# Patient Record
Sex: MALE | Race: WHITE | NOT HISPANIC OR LATINO | Employment: OTHER | URBAN - METROPOLITAN AREA
[De-identification: names, ages, dates, MRNs, and addresses within clinical notes are randomized per-mention and may not be internally consistent; named-entity substitution may affect disease eponyms.]

---

## 2017-09-01 ENCOUNTER — ANESTHESIA EVENT (OUTPATIENT)
Dept: PERIOP | Facility: HOSPITAL | Age: 72
End: 2017-09-01

## 2017-09-01 ENCOUNTER — ANESTHESIA (OUTPATIENT)
Dept: PERIOP | Facility: HOSPITAL | Age: 72
End: 2017-09-01

## 2017-09-01 ENCOUNTER — HOSPITAL ENCOUNTER (OUTPATIENT)
Facility: HOSPITAL | Age: 72
Setting detail: OBSERVATION
Discharge: HOME | End: 2017-09-05
Attending: EMERGENCY MEDICINE | Admitting: SURGERY

## 2017-09-01 ENCOUNTER — APPOINTMENT (OUTPATIENT)
Dept: GENERAL RADIOLOGY | Facility: HOSPITAL | Age: 72
End: 2017-09-01

## 2017-09-01 ENCOUNTER — APPOINTMENT (OUTPATIENT)
Dept: CT IMAGING | Facility: HOSPITAL | Age: 72
End: 2017-09-01

## 2017-09-01 DIAGNOSIS — R91.1 LUNG NODULE: ICD-10-CM

## 2017-09-01 DIAGNOSIS — K80.20 CHOLELITHIASES: ICD-10-CM

## 2017-09-01 DIAGNOSIS — K81.0 ACUTE CHOLECYSTITIS: Primary | ICD-10-CM

## 2017-09-01 PROBLEM — K80.00 CALCULUS OF GALLBLADDER WITH ACUTE CHOLECYSTITIS WITHOUT OBSTRUCTION: Status: ACTIVE | Noted: 2017-09-01

## 2017-09-01 LAB
ALBUMIN SERPL-MCNC: 3.8 G/DL (ref 3.5–5.2)
ALBUMIN/GLOB SERPL: 1.1 G/DL
ALP SERPL-CCNC: 80 U/L (ref 39–117)
ALT SERPL W P-5'-P-CCNC: 45 U/L (ref 1–41)
ANION GAP SERPL CALCULATED.3IONS-SCNC: 15.2 MMOL/L
AST SERPL-CCNC: 24 U/L (ref 1–40)
BACTERIA UR QL AUTO: ABNORMAL /HPF
BASOPHILS # BLD AUTO: 0.01 10*3/MM3 (ref 0–0.2)
BASOPHILS NFR BLD AUTO: 0 % (ref 0–1.5)
BILIRUB SERPL-MCNC: 1.1 MG/DL (ref 0.1–1.2)
BILIRUB UR QL STRIP: NEGATIVE
BUN BLD-MCNC: 27 MG/DL (ref 8–23)
BUN/CREAT SERPL: 29.7 (ref 7–25)
CALCIUM SPEC-SCNC: 9.2 MG/DL (ref 8.6–10.5)
CHLORIDE SERPL-SCNC: 99 MMOL/L (ref 98–107)
CLARITY UR: CLEAR
CO2 SERPL-SCNC: 22.8 MMOL/L (ref 22–29)
COLOR UR: ABNORMAL
CREAT BLD-MCNC: 0.91 MG/DL (ref 0.76–1.27)
DEPRECATED RDW RBC AUTO: 44.5 FL (ref 37–54)
EOSINOPHIL # BLD AUTO: 0.01 10*3/MM3 (ref 0–0.7)
EOSINOPHIL NFR BLD AUTO: 0 % (ref 0.3–6.2)
ERYTHROCYTE [DISTWIDTH] IN BLOOD BY AUTOMATED COUNT: 12.8 % (ref 11.5–14.5)
GFR SERPL CREATININE-BSD FRML MDRD: 82 ML/MIN/1.73
GLOBULIN UR ELPH-MCNC: 3.6 GM/DL
GLUCOSE BLD-MCNC: 169 MG/DL (ref 65–99)
GLUCOSE UR STRIP-MCNC: NEGATIVE MG/DL
HCT VFR BLD AUTO: 44.2 % (ref 40.4–52.2)
HGB BLD-MCNC: 14.9 G/DL (ref 13.7–17.6)
HGB UR QL STRIP.AUTO: NEGATIVE
HYALINE CASTS UR QL AUTO: ABNORMAL /LPF
IMM GRANULOCYTES # BLD: 0.1 10*3/MM3 (ref 0–0.03)
IMM GRANULOCYTES NFR BLD: 0.5 % (ref 0–0.5)
INR PPP: 1.06 (ref 0.9–1.1)
KETONES UR QL STRIP: ABNORMAL
LEUKOCYTE ESTERASE UR QL STRIP.AUTO: ABNORMAL
LIPASE SERPL-CCNC: 14 U/L (ref 13–60)
LYMPHOCYTES # BLD AUTO: 1.46 10*3/MM3 (ref 0.9–4.8)
LYMPHOCYTES NFR BLD AUTO: 7.1 % (ref 19.6–45.3)
MCH RBC QN AUTO: 32.2 PG (ref 27–32.7)
MCHC RBC AUTO-ENTMCNC: 33.7 G/DL (ref 32.6–36.4)
MCV RBC AUTO: 95.5 FL (ref 79.8–96.2)
MONOCYTES # BLD AUTO: 0.85 10*3/MM3 (ref 0.2–1.2)
MONOCYTES NFR BLD AUTO: 4.1 % (ref 5–12)
NEUTROPHILS # BLD AUTO: 18.19 10*3/MM3 (ref 1.9–8.1)
NEUTROPHILS NFR BLD AUTO: 88.3 % (ref 42.7–76)
NITRITE UR QL STRIP: POSITIVE
PH UR STRIP.AUTO: 5.5 [PH] (ref 5–8)
PLATELET # BLD AUTO: 238 10*3/MM3 (ref 140–500)
PMV BLD AUTO: 11.7 FL (ref 6–12)
POTASSIUM BLD-SCNC: 4.2 MMOL/L (ref 3.5–5.2)
PROT SERPL-MCNC: 7.4 G/DL (ref 6–8.5)
PROT UR QL STRIP: NEGATIVE
PROTHROMBIN TIME: 13.4 SECONDS (ref 11.7–14.2)
RBC # BLD AUTO: 4.63 10*6/MM3 (ref 4.6–6)
RBC # UR: ABNORMAL /HPF
REF LAB TEST METHOD: ABNORMAL
SODIUM BLD-SCNC: 137 MMOL/L (ref 136–145)
SP GR UR STRIP: 1.02 (ref 1–1.03)
SQUAMOUS #/AREA URNS HPF: ABNORMAL /HPF
UROBILINOGEN UR QL STRIP: ABNORMAL
WBC NRBC COR # BLD: 20.62 10*3/MM3 (ref 4.5–10.7)
WBC UR QL AUTO: ABNORMAL /HPF

## 2017-09-01 PROCEDURE — 25010000002 KETOROLAC TROMETHAMINE PER 15 MG: Performed by: ANESTHESIOLOGY

## 2017-09-01 PROCEDURE — 47563 LAPARO CHOLECYSTECTOMY/GRAPH: CPT | Performed by: SURGERY

## 2017-09-01 PROCEDURE — 25010000002 PROPOFOL 10 MG/ML EMULSION: Performed by: ANESTHESIOLOGY

## 2017-09-01 PROCEDURE — 96375 TX/PRO/DX INJ NEW DRUG ADDON: CPT

## 2017-09-01 PROCEDURE — G0378 HOSPITAL OBSERVATION PER HR: HCPCS

## 2017-09-01 PROCEDURE — 25010000002 FENTANYL CITRATE (PF) 100 MCG/2ML SOLUTION: Performed by: ANESTHESIOLOGY

## 2017-09-01 PROCEDURE — 83690 ASSAY OF LIPASE: CPT | Performed by: EMERGENCY MEDICINE

## 2017-09-01 PROCEDURE — 85025 COMPLETE CBC W/AUTO DIFF WBC: CPT | Performed by: EMERGENCY MEDICINE

## 2017-09-01 PROCEDURE — 94799 UNLISTED PULMONARY SVC/PX: CPT

## 2017-09-01 PROCEDURE — 25010000002 MIDAZOLAM PER 1 MG: Performed by: ANESTHESIOLOGY

## 2017-09-01 PROCEDURE — 81001 URINALYSIS AUTO W/SCOPE: CPT | Performed by: EMERGENCY MEDICINE

## 2017-09-01 PROCEDURE — 25010000002 ONDANSETRON PER 1 MG: Performed by: SURGERY

## 2017-09-01 PROCEDURE — 0 IOPAMIDOL 61 % SOLUTION: Performed by: EMERGENCY MEDICINE

## 2017-09-01 PROCEDURE — 96376 TX/PRO/DX INJ SAME DRUG ADON: CPT

## 2017-09-01 PROCEDURE — 25010000002 HYDROMORPHONE PER 4 MG: Performed by: SURGERY

## 2017-09-01 PROCEDURE — 96374 THER/PROPH/DIAG INJ IV PUSH: CPT

## 2017-09-01 PROCEDURE — 99219 PR INITIAL OBSERVATION CARE/DAY 50 MINUTES: CPT | Performed by: SURGERY

## 2017-09-01 PROCEDURE — 87086 URINE CULTURE/COLONY COUNT: CPT | Performed by: EMERGENCY MEDICINE

## 2017-09-01 PROCEDURE — 25010000002 HYDROMORPHONE PER 4 MG: Performed by: EMERGENCY MEDICINE

## 2017-09-01 PROCEDURE — 99284 EMERGENCY DEPT VISIT MOD MDM: CPT

## 2017-09-01 PROCEDURE — 0 IOPAMIDOL PER 1 ML: Performed by: SURGERY

## 2017-09-01 PROCEDURE — 74177 CT ABD & PELVIS W/CONTRAST: CPT

## 2017-09-01 PROCEDURE — 93005 ELECTROCARDIOGRAM TRACING: CPT | Performed by: EMERGENCY MEDICINE

## 2017-09-01 PROCEDURE — 25010000002 ONDANSETRON PER 1 MG: Performed by: EMERGENCY MEDICINE

## 2017-09-01 PROCEDURE — 25010000002 HYDROMORPHONE PER 4 MG: Performed by: ANESTHESIOLOGY

## 2017-09-01 PROCEDURE — 80053 COMPREHEN METABOLIC PANEL: CPT | Performed by: EMERGENCY MEDICINE

## 2017-09-01 PROCEDURE — 88304 TISSUE EXAM BY PATHOLOGIST: CPT | Performed by: SURGERY

## 2017-09-01 PROCEDURE — 85610 PROTHROMBIN TIME: CPT | Performed by: EMERGENCY MEDICINE

## 2017-09-01 PROCEDURE — 74300 X-RAY BILE DUCTS/PANCREAS: CPT

## 2017-09-01 PROCEDURE — 96361 HYDRATE IV INFUSION ADD-ON: CPT

## 2017-09-01 PROCEDURE — 93010 ELECTROCARDIOGRAM REPORT: CPT | Performed by: INTERNAL MEDICINE

## 2017-09-01 RX ORDER — NALOXONE HCL 0.4 MG/ML
0.4 VIAL (ML) INJECTION
Status: DISCONTINUED | OUTPATIENT
Start: 2017-09-01 | End: 2017-09-03

## 2017-09-01 RX ORDER — MAGNESIUM HYDROXIDE 1200 MG/15ML
LIQUID ORAL AS NEEDED
Status: DISCONTINUED | OUTPATIENT
Start: 2017-09-01 | End: 2017-09-05 | Stop reason: HOSPADM

## 2017-09-01 RX ORDER — DIPHENHYDRAMINE HYDROCHLORIDE 50 MG/ML
12.5 INJECTION INTRAMUSCULAR; INTRAVENOUS
Status: DISCONTINUED | OUTPATIENT
Start: 2017-09-01 | End: 2017-09-01 | Stop reason: HOSPADM

## 2017-09-01 RX ORDER — HYDROCODONE BITARTRATE AND ACETAMINOPHEN 7.5; 325 MG/1; MG/1
1 TABLET ORAL ONCE AS NEEDED
Status: DISCONTINUED | OUTPATIENT
Start: 2017-09-01 | End: 2017-09-01 | Stop reason: HOSPADM

## 2017-09-01 RX ORDER — FLUMAZENIL 0.1 MG/ML
0.2 INJECTION INTRAVENOUS AS NEEDED
Status: DISCONTINUED | OUTPATIENT
Start: 2017-09-01 | End: 2017-09-01 | Stop reason: HOSPADM

## 2017-09-01 RX ORDER — ATORVASTATIN CALCIUM 20 MG/1
40 TABLET, FILM COATED ORAL DAILY
Status: DISCONTINUED | OUTPATIENT
Start: 2017-09-02 | End: 2017-09-05 | Stop reason: HOSPADM

## 2017-09-01 RX ORDER — FENTANYL CITRATE 50 UG/ML
INJECTION, SOLUTION INTRAMUSCULAR; INTRAVENOUS AS NEEDED
Status: DISCONTINUED | OUTPATIENT
Start: 2017-09-01 | End: 2017-09-01 | Stop reason: SURG

## 2017-09-01 RX ORDER — EPHEDRINE SULFATE 50 MG/ML
5 INJECTION, SOLUTION INTRAVENOUS ONCE AS NEEDED
Status: DISCONTINUED | OUTPATIENT
Start: 2017-09-01 | End: 2017-09-01 | Stop reason: HOSPADM

## 2017-09-01 RX ORDER — ENALAPRIL MALEATE 20 MG/1
20 TABLET ORAL DAILY
COMMUNITY

## 2017-09-01 RX ORDER — SODIUM CHLORIDE, SODIUM LACTATE, POTASSIUM CHLORIDE, CALCIUM CHLORIDE 600; 310; 30; 20 MG/100ML; MG/100ML; MG/100ML; MG/100ML
9 INJECTION, SOLUTION INTRAVENOUS CONTINUOUS
Status: DISCONTINUED | OUTPATIENT
Start: 2017-09-01 | End: 2017-09-01

## 2017-09-01 RX ORDER — HYDROMORPHONE HYDROCHLORIDE 1 MG/ML
0.5 INJECTION, SOLUTION INTRAMUSCULAR; INTRAVENOUS; SUBCUTANEOUS
Status: DISCONTINUED | OUTPATIENT
Start: 2017-09-01 | End: 2017-09-01 | Stop reason: HOSPADM

## 2017-09-01 RX ORDER — ROCURONIUM BROMIDE 10 MG/ML
INJECTION, SOLUTION INTRAVENOUS AS NEEDED
Status: DISCONTINUED | OUTPATIENT
Start: 2017-09-01 | End: 2017-09-01 | Stop reason: SURG

## 2017-09-01 RX ORDER — ONDANSETRON 2 MG/ML
4 INJECTION INTRAMUSCULAR; INTRAVENOUS EVERY 6 HOURS PRN
Status: DISCONTINUED | OUTPATIENT
Start: 2017-09-01 | End: 2017-09-05 | Stop reason: HOSPADM

## 2017-09-01 RX ORDER — MIDAZOLAM HYDROCHLORIDE 1 MG/ML
2 INJECTION INTRAMUSCULAR; INTRAVENOUS
Status: DISCONTINUED | OUTPATIENT
Start: 2017-09-01 | End: 2017-09-01 | Stop reason: HOSPADM

## 2017-09-01 RX ORDER — FAMOTIDINE 10 MG/ML
20 INJECTION, SOLUTION INTRAVENOUS ONCE
Status: COMPLETED | OUTPATIENT
Start: 2017-09-01 | End: 2017-09-01

## 2017-09-01 RX ORDER — HYDROMORPHONE HYDROCHLORIDE 1 MG/ML
0.5 INJECTION, SOLUTION INTRAMUSCULAR; INTRAVENOUS; SUBCUTANEOUS ONCE
Status: COMPLETED | OUTPATIENT
Start: 2017-09-01 | End: 2017-09-01

## 2017-09-01 RX ORDER — LIDOCAINE HYDROCHLORIDE 20 MG/ML
INJECTION, SOLUTION INFILTRATION; PERINEURAL AS NEEDED
Status: DISCONTINUED | OUTPATIENT
Start: 2017-09-01 | End: 2017-09-01 | Stop reason: SURG

## 2017-09-01 RX ORDER — PROMETHAZINE HYDROCHLORIDE 25 MG/ML
12.5 INJECTION, SOLUTION INTRAMUSCULAR; INTRAVENOUS ONCE AS NEEDED
Status: DISCONTINUED | OUTPATIENT
Start: 2017-09-01 | End: 2017-09-01 | Stop reason: HOSPADM

## 2017-09-01 RX ORDER — MIDAZOLAM HYDROCHLORIDE 1 MG/ML
1 INJECTION INTRAMUSCULAR; INTRAVENOUS
Status: DISCONTINUED | OUTPATIENT
Start: 2017-09-01 | End: 2017-09-01 | Stop reason: HOSPADM

## 2017-09-01 RX ORDER — LEVOFLOXACIN 5 MG/ML
500 INJECTION, SOLUTION INTRAVENOUS
Status: DISCONTINUED | OUTPATIENT
Start: 2017-09-01 | End: 2017-09-01 | Stop reason: HOSPADM

## 2017-09-01 RX ORDER — HYDROCHLOROTHIAZIDE 12.5 MG/1
25 CAPSULE, GELATIN COATED ORAL DAILY
Status: DISCONTINUED | OUTPATIENT
Start: 2017-09-02 | End: 2017-09-05 | Stop reason: HOSPADM

## 2017-09-01 RX ORDER — FENTANYL CITRATE 50 UG/ML
50 INJECTION, SOLUTION INTRAMUSCULAR; INTRAVENOUS
Status: DISCONTINUED | OUTPATIENT
Start: 2017-09-01 | End: 2017-09-01 | Stop reason: HOSPADM

## 2017-09-01 RX ORDER — PROPOFOL 10 MG/ML
VIAL (ML) INTRAVENOUS AS NEEDED
Status: DISCONTINUED | OUTPATIENT
Start: 2017-09-01 | End: 2017-09-01 | Stop reason: SURG

## 2017-09-01 RX ORDER — EPHEDRINE SULFATE 50 MG/ML
INJECTION, SOLUTION INTRAVENOUS AS NEEDED
Status: DISCONTINUED | OUTPATIENT
Start: 2017-09-01 | End: 2017-09-01 | Stop reason: SURG

## 2017-09-01 RX ORDER — HYDROCODONE BITARTRATE AND ACETAMINOPHEN 5; 325 MG/1; MG/1
1 TABLET ORAL EVERY 4 HOURS PRN
Status: DISCONTINUED | OUTPATIENT
Start: 2017-09-01 | End: 2017-09-03

## 2017-09-01 RX ORDER — SODIUM CHLORIDE 0.9 % (FLUSH) 0.9 %
1-10 SYRINGE (ML) INJECTION AS NEEDED
Status: DISCONTINUED | OUTPATIENT
Start: 2017-09-01 | End: 2017-09-05 | Stop reason: HOSPADM

## 2017-09-01 RX ORDER — HYDROMORPHONE HYDROCHLORIDE 1 MG/ML
0.5 INJECTION, SOLUTION INTRAMUSCULAR; INTRAVENOUS; SUBCUTANEOUS
Status: DISCONTINUED | OUTPATIENT
Start: 2017-09-01 | End: 2017-09-03

## 2017-09-01 RX ORDER — DEXTROSE, SODIUM CHLORIDE, AND POTASSIUM CHLORIDE 5; .45; .15 G/100ML; G/100ML; G/100ML
100 INJECTION INTRAVENOUS CONTINUOUS
Status: DISCONTINUED | OUTPATIENT
Start: 2017-09-01 | End: 2017-09-02

## 2017-09-01 RX ORDER — LIDOCAINE HYDROCHLORIDE 10 MG/ML
0.5 INJECTION, SOLUTION EPIDURAL; INFILTRATION; INTRACAUDAL; PERINEURAL ONCE AS NEEDED
Status: DISCONTINUED | OUTPATIENT
Start: 2017-09-01 | End: 2017-09-01 | Stop reason: HOSPADM

## 2017-09-01 RX ORDER — HYDRALAZINE HYDROCHLORIDE 20 MG/ML
5 INJECTION INTRAMUSCULAR; INTRAVENOUS
Status: DISCONTINUED | OUTPATIENT
Start: 2017-09-01 | End: 2017-09-01 | Stop reason: HOSPADM

## 2017-09-01 RX ORDER — PROMETHAZINE HYDROCHLORIDE 25 MG/1
12.5 TABLET ORAL ONCE AS NEEDED
Status: DISCONTINUED | OUTPATIENT
Start: 2017-09-01 | End: 2017-09-01 | Stop reason: HOSPADM

## 2017-09-01 RX ORDER — LABETALOL HYDROCHLORIDE 5 MG/ML
5 INJECTION, SOLUTION INTRAVENOUS
Status: DISCONTINUED | OUTPATIENT
Start: 2017-09-01 | End: 2017-09-01 | Stop reason: HOSPADM

## 2017-09-01 RX ORDER — KETOROLAC TROMETHAMINE 30 MG/ML
INJECTION, SOLUTION INTRAMUSCULAR; INTRAVENOUS AS NEEDED
Status: DISCONTINUED | OUTPATIENT
Start: 2017-09-01 | End: 2017-09-01 | Stop reason: SURG

## 2017-09-01 RX ORDER — SODIUM CHLORIDE 0.9 % (FLUSH) 0.9 %
1-10 SYRINGE (ML) INJECTION AS NEEDED
Status: DISCONTINUED | OUTPATIENT
Start: 2017-09-01 | End: 2017-09-01 | Stop reason: HOSPADM

## 2017-09-01 RX ORDER — ONDANSETRON 2 MG/ML
4 INJECTION INTRAMUSCULAR; INTRAVENOUS ONCE
Status: COMPLETED | OUTPATIENT
Start: 2017-09-01 | End: 2017-09-01

## 2017-09-01 RX ORDER — ONDANSETRON 2 MG/ML
4 INJECTION INTRAMUSCULAR; INTRAVENOUS ONCE AS NEEDED
Status: DISCONTINUED | OUTPATIENT
Start: 2017-09-01 | End: 2017-09-01 | Stop reason: HOSPADM

## 2017-09-01 RX ORDER — NALOXONE HCL 0.4 MG/ML
0.2 VIAL (ML) INJECTION AS NEEDED
Status: DISCONTINUED | OUTPATIENT
Start: 2017-09-01 | End: 2017-09-01 | Stop reason: HOSPADM

## 2017-09-01 RX ORDER — PROMETHAZINE HYDROCHLORIDE 25 MG/1
25 TABLET ORAL ONCE AS NEEDED
Status: DISCONTINUED | OUTPATIENT
Start: 2017-09-01 | End: 2017-09-01 | Stop reason: HOSPADM

## 2017-09-01 RX ORDER — OXYCODONE AND ACETAMINOPHEN 7.5; 325 MG/1; MG/1
1 TABLET ORAL ONCE AS NEEDED
Status: DISCONTINUED | OUTPATIENT
Start: 2017-09-01 | End: 2017-09-01 | Stop reason: HOSPADM

## 2017-09-01 RX ORDER — GLYCOPYRROLATE 0.2 MG/ML
INJECTION INTRAMUSCULAR; INTRAVENOUS AS NEEDED
Status: DISCONTINUED | OUTPATIENT
Start: 2017-09-01 | End: 2017-09-01 | Stop reason: SURG

## 2017-09-01 RX ORDER — PROMETHAZINE HYDROCHLORIDE 25 MG/1
25 SUPPOSITORY RECTAL ONCE AS NEEDED
Status: DISCONTINUED | OUTPATIENT
Start: 2017-09-01 | End: 2017-09-01 | Stop reason: HOSPADM

## 2017-09-01 RX ORDER — BUPIVACAINE HYDROCHLORIDE AND EPINEPHRINE 5; 5 MG/ML; UG/ML
INJECTION, SOLUTION EPIDURAL; INTRACAUDAL; PERINEURAL AS NEEDED
Status: DISCONTINUED | OUTPATIENT
Start: 2017-09-01 | End: 2017-09-05 | Stop reason: HOSPADM

## 2017-09-01 RX ADMIN — SODIUM CHLORIDE, POTASSIUM CHLORIDE, SODIUM LACTATE AND CALCIUM CHLORIDE 9 ML/HR: 600; 310; 30; 20 INJECTION, SOLUTION INTRAVENOUS at 16:38

## 2017-09-01 RX ADMIN — GLYCOPYRROLATE 0.2 MG: 0.2 INJECTION INTRAMUSCULAR; INTRAVENOUS at 18:05

## 2017-09-01 RX ADMIN — HYDROMORPHONE HYDROCHLORIDE 0.5 MG: 1 INJECTION, SOLUTION INTRAMUSCULAR; INTRAVENOUS; SUBCUTANEOUS at 10:39

## 2017-09-01 RX ADMIN — PROPOFOL 50 MG: 10 INJECTION, EMULSION INTRAVENOUS at 17:42

## 2017-09-01 RX ADMIN — LIDOCAINE HYDROCHLORIDE 50 MG: 20 INJECTION, SOLUTION INFILTRATION; PERINEURAL at 17:39

## 2017-09-01 RX ADMIN — HYDROMORPHONE HYDROCHLORIDE 0.5 MG: 1 INJECTION, SOLUTION INTRAMUSCULAR; INTRAVENOUS; SUBCUTANEOUS at 19:33

## 2017-09-01 RX ADMIN — SUGAMMADEX 3 ML: 100 INJECTION, SOLUTION INTRAVENOUS at 18:39

## 2017-09-01 RX ADMIN — HYDROMORPHONE HYDROCHLORIDE 0.5 MG: 1 INJECTION, SOLUTION INTRAMUSCULAR; INTRAVENOUS; SUBCUTANEOUS at 07:38

## 2017-09-01 RX ADMIN — POTASSIUM CHLORIDE, DEXTROSE MONOHYDRATE AND SODIUM CHLORIDE 100 ML/HR: 150; 5; 450 INJECTION, SOLUTION INTRAVENOUS at 09:47

## 2017-09-01 RX ADMIN — ROCURONIUM BROMIDE 40 MG: 10 INJECTION INTRAVENOUS at 17:39

## 2017-09-01 RX ADMIN — EPHEDRINE SULFATE 10 MG: 50 INJECTION INTRAMUSCULAR; INTRAVENOUS; SUBCUTANEOUS at 18:03

## 2017-09-01 RX ADMIN — FENTANYL CITRATE 50 MCG: 50 INJECTION INTRAMUSCULAR; INTRAVENOUS at 18:12

## 2017-09-01 RX ADMIN — KETOROLAC TROMETHAMINE 30 MG: 30 INJECTION, SOLUTION INTRAMUSCULAR; INTRAVENOUS at 18:25

## 2017-09-01 RX ADMIN — SODIUM CHLORIDE, POTASSIUM CHLORIDE, SODIUM LACTATE AND CALCIUM CHLORIDE: 600; 310; 30; 20 INJECTION, SOLUTION INTRAVENOUS at 17:35

## 2017-09-01 RX ADMIN — FAMOTIDINE 20 MG: 10 INJECTION, SOLUTION INTRAVENOUS at 16:39

## 2017-09-01 RX ADMIN — MIDAZOLAM 1 MG: 1 INJECTION INTRAMUSCULAR; INTRAVENOUS at 16:39

## 2017-09-01 RX ADMIN — FENTANYL CITRATE 50 MCG: 50 INJECTION INTRAMUSCULAR; INTRAVENOUS at 17:39

## 2017-09-01 RX ADMIN — HYDROMORPHONE HYDROCHLORIDE 0.5 MG: 1 INJECTION, SOLUTION INTRAMUSCULAR; INTRAVENOUS; SUBCUTANEOUS at 22:43

## 2017-09-01 RX ADMIN — SODIUM CHLORIDE, POTASSIUM CHLORIDE, SODIUM LACTATE AND CALCIUM CHLORIDE: 600; 310; 30; 20 INJECTION, SOLUTION INTRAVENOUS at 18:29

## 2017-09-01 RX ADMIN — ONDANSETRON 4 MG: 2 INJECTION INTRAMUSCULAR; INTRAVENOUS at 07:37

## 2017-09-01 RX ADMIN — HYDROCODONE BITARTRATE AND ACETAMINOPHEN 1 TABLET: 5; 325 TABLET ORAL at 19:50

## 2017-09-01 RX ADMIN — HYDROMORPHONE HYDROCHLORIDE 0.5 MG: 1 INJECTION, SOLUTION INTRAMUSCULAR; INTRAVENOUS; SUBCUTANEOUS at 19:09

## 2017-09-01 RX ADMIN — IOPAMIDOL 85 ML: 612 INJECTION, SOLUTION INTRAVENOUS at 08:11

## 2017-09-01 RX ADMIN — ONDANSETRON 4 MG: 2 INJECTION INTRAMUSCULAR; INTRAVENOUS at 18:10

## 2017-09-01 RX ADMIN — PROPOFOL 150 MG: 10 INJECTION, EMULSION INTRAVENOUS at 17:39

## 2017-09-01 RX ADMIN — HYDROMORPHONE HYDROCHLORIDE 0.5 MG: 1 INJECTION, SOLUTION INTRAMUSCULAR; INTRAVENOUS; SUBCUTANEOUS at 08:52

## 2017-09-01 NOTE — H&P
General Surgery  History and Physical    CC: Abdominal pain    HPI: The patient is a pleasant 72 y.o. year-old gentleman who presents today for evaluation of burning epigastric pain that awoke him from sleep this morning at 01:30 AM. He states the pain has been constant in duration and worsening in severity since its onset. Laying flat seems to help the pain a bit, but nothing seems to completely alleviate the pain. He has noticed no associated with PO intake. He had a similar episode last Monday that resolved on its own. He has had some associated nausea and vomiting with both today's episode as well as the similar event last Monday. A CT abdomen/pelvis performed in the ED shows acute cholecystitis with cholelithiasis. He is being treated currently for a UTI as well as bronchitis with Bactrim and a medrol steroid taper.    Past Medical History: Hypertension, hyperlipidemia, diabetes mellitus, recent UTI and bronchitis    Past Surgical History: Tonsillectomy    Medications:   Metformin 750 mg daily  Atorvastatin 40 mg daily  Hydrochlorothiazide 25 mg daily  Medrol steroid pack    Allergies: Penicillins and Percodan    Family History: No family history of anyone with gallbladder disease    Social History: , nonsmoker, no regular alcohol use    ROS: A comprehensive review of systems was conducted and significant only for the following positives: abdominal pain, nausea, and vomiting  All other systems reviewed and negative    Physical Exam:  Vitals:    09/01/17 1141   BP:    Pulse: 68   Resp: 16   Temp:    SpO2:      General: No acute distress, well-nourished & well-developed  HEAD: normocephalic, atraumatic  EYES: normal conjunctiva, sclera anicteric  EARS: grossly normal hearing  NECK: supple, no thyromegaly  CARDIOVASCULAR: regular rate and rhythm  RESPIRATORY: clear to auscultation bilaterally  GASTROINTESTINAL: soft, nontender, non-distended  PSYCHIATRIC: oriented x3, normal mood and  affect    LABS:    Results from last 7 days  Lab Units 09/01/17  0640   WBC 10*3/mm3 20.62*   HEMOGLOBIN g/dL 14.9   HEMATOCRIT % 44.2   PLATELETS 10*3/mm3 238       Results from last 7 days  Lab Units 09/01/17  0640   SODIUM mmol/L 137   POTASSIUM mmol/L 4.2   CHLORIDE mmol/L 99   CO2 mmol/L 22.8   BUN mg/dL 27*   CREATININE mg/dL 0.91   CALCIUM mg/dL 9.2   BILIRUBIN mg/dL 1.1   ALK PHOS U/L 80   ALT (SGPT) U/L 45*   AST (SGOT) U/L 24   GLUCOSE mg/dL 169*       IMAGING:  CT ABDOMEN/PELVIS:  1. Distended gallbladder with submucosal edema and cholelithiasis. The findings are most suspicious for acute cholecystitis.  2. A 7 mm right lower lobe nodule for which followup CT chest is recommended in 3 months to reevaluate. L5-S1 degenerative disc disease with vacuum disc phenomenon.  3. Nonobstructing right nephrolith.  4. Mild dilatation of the pancreatic duct especially in the head where it measures up to 6 mm. There is also an additional low attenuating 9 mm lesion within the body. In the absence of any remote imaging, consider followup with MRI of the pancreas and MRCP on a nonemergent basis.    ASSESSMENT & PLAN  Mr. Cleaning is a 72 year-old gentleman with acute cholecystitis and cholelithiasis. I have recommended we proceed with laparoscopic cholecystectomy with cholangiogram today to alleviate his symptoms and prevent possible gallstone induced pancreatitis. He understands that if his cholangiogram shows any signs of a CBD stone, this would warrant ERCP tomorrow. Otherwise, if all goes well I see no reason he won't be able to be discharged tomorrow post-operatively. We discussed the risks of the procedure to include bleeding, possible common bile duct injury, and possible post-operative bile leak. Despite these risks, he has consented to proceed.    Crystal Mclean MD  General and Endoscopic Surgery  [facility]    [address], [address]  [place], [place], [address]  P: [phone]  F: [phone]

## 2017-09-01 NOTE — ED TRIAGE NOTES
Burning epigastric pain since 0130 this am.  States had an episode of same Monday night with associated nausea that resolved.

## 2017-09-01 NOTE — ANESTHESIA PROCEDURE NOTES
Airway  Urgency: elective    Airway not difficult    General Information and Staff    Patient location during procedure: OR  Anesthesiologist: MELANIE JERNIGAN    Indications and Patient Condition  Indications for airway management: airway protection    Preoxygenated: yes  MILS maintained throughout  Mask difficulty assessment: 1 - vent by mask    Final Airway Details  Final airway type: endotracheal airway      Successful airway: ETT  Cuffed: yes   Successful intubation technique: direct laryngoscopy  Facilitating devices/methods: Bougie and intubating stylet  Endotracheal tube insertion site: oral  Blade: Tera  Blade size: #3  ETT size: 7.5 mm  Cormack-Lehane Classification: grade III - view of epiglottis only  Placement verified by: chest auscultation and capnometry   Measured from: lips  ETT to lips (cm): 23  Number of attempts at approach: 3 or more

## 2017-09-01 NOTE — PLAN OF CARE
Problem: Patient Care Overview (Adult)  Goal: Plan of Care Review  Outcome: Ongoing (interventions implemented as appropriate)    09/01/17 1428   Coping/Psychosocial Response Interventions   Plan Of Care Reviewed With patient;spouse   Patient Care Overview   Progress improving   Outcome Evaluation   Outcome Summary/Follow up Plan iv dilaudid given for pain control, npo for lap lucina later today, consent signed       Goal: Adult Individualization and Mutuality  Outcome: Ongoing (interventions implemented as appropriate)  Goal: Discharge Needs Assessment  Outcome: Ongoing (interventions implemented as appropriate)    Problem: Pain, Acute (Adult)  Goal: Identify Related Risk Factors and Signs and Symptoms  Outcome: Outcome(s) achieved Date Met:  09/01/17  Goal: Acceptable Pain Control/Comfort Level  Outcome: Ongoing (interventions implemented as appropriate)

## 2017-09-01 NOTE — ED PROVIDER NOTES
" EMERGENCY DEPARTMENT ENCOUNTER    CHIEF COMPLAINT  Chief Complaint: abdominal pain  History given by: pt  History limited by: N/A  Room Number: 19/19  PMD: Sidney Moreno MD      HPI:  Pt is a 72 y.o. male who presents complaining of epigastric constant burning pain that began at 0130 this morning. Pt states on 8/27/17 he experienced N/V, but these symptoms resolved. Pt also c/o of mild dysuria that he has recently seen his PCP for and placed on antibiotics. Pt states the pain worsens when he sits up; pain is mild when pt is resting, but when he sits up it becomes moderate.   Pt is currently on Bactrim and solu-medrol.     Duration/Onset/Timing:   Location: Epigastric abdomen   Radiation: None reported   Quality: \"burning\"  Intensity/Severity: mild to moderate depending on position   Associated Symptoms: N/V, mild dysuria  Aggravating or Alleviating Factors: Sitting up  Previous Episodes: Pt had initial symptoms of N/V on Monday 8/27/17.       PAST MEDICAL HISTORY  Active Ambulatory Problems     Diagnosis Date Noted   • No Active Ambulatory Problems     Resolved Ambulatory Problems     Diagnosis Date Noted   • No Resolved Ambulatory Problems     Past Medical History:   Diagnosis Date   • Bronchitis    • Hyperlipidemia    • Hypertension    • UTI (urinary tract infection)        PAST SURGICAL HISTORY  Past Surgical History:   Procedure Laterality Date   • TONSILLECTOMY         FAMILY HISTORY  History reviewed. No pertinent family history.    SOCIAL HISTORY  Social History     Social History   • Marital status:      Spouse name: N/A   • Number of children: N/A   • Years of education: N/A     Occupational History   • Not on file.     Social History Main Topics   • Smoking status: Never Smoker   • Smokeless tobacco: Not on file   • Alcohol use No   • Drug use: No   • Sexual activity: Not on file     Other Topics Concern   • Not on file     Social History Narrative   • No narrative on file "       ALLERGIES  Penicillins and Percodan [oxycodone-aspirin]    REVIEW OF SYSTEMS  Review of Systems   Constitutional: Negative.  Negative for chills and fever.   HENT: Negative.  Negative for sore throat.    Eyes: Negative.    Respiratory: Negative.  Negative for cough and shortness of breath.    Cardiovascular: Negative.  Negative for chest pain.   Gastrointestinal: Positive for abdominal pain (epigastric ), nausea and vomiting (on Monday ). Negative for blood in stool and diarrhea.   Genitourinary: Positive for dysuria.   Musculoskeletal: Negative.  Negative for back pain.   Skin: Negative.  Negative for rash.   Neurological: Negative.  Negative for headaches.       PHYSICAL EXAM  ED Triage Vitals   Temp Heart Rate Resp BP SpO2   09/01/17 0549 09/01/17 0549 09/01/17 0549 09/01/17 0551 09/01/17 0549   97.2 °F (36.2 °C) 83 16 130/80 94 %      Temp src Heart Rate Source Patient Position BP Location FiO2 (%)   -- 09/01/17 0549 -- -- --    Monitor          Physical Exam   Constitutional: No distress.   HENT:   Head: Normocephalic and atraumatic.   Mouth/Throat: Oropharynx is clear and moist.   Eyes:   Unremarkable   Cardiovascular: Normal rate and regular rhythm.    Pulmonary/Chest: Breath sounds normal. No respiratory distress.   Abdominal: There is tenderness (mild epigastric and RUQ).   Musculoskeletal: He exhibits no edema or tenderness.   Neurological: He is alert.   Skin: No rash noted.   Nursing note and vitals reviewed.      LAB RESULTS  Lab Results (last 24 hours)     Procedure Component Value Units Date/Time    Comprehensive Metabolic Panel [[id number]]  (Abnormal) Collected:  09/01/17 0640    Specimen:  Blood Updated:  09/01/17 0718     Glucose 169 (H) mg/dL      BUN 27 (H) mg/dL      Creatinine 0.91 mg/dL      Sodium 137 mmol/L      Potassium 4.2 mmol/L      Chloride 99 mmol/L      CO2 22.8 mmol/L      Calcium 9.2 mg/dL      Total Protein 7.4 g/dL      Albumin 3.80 g/dL      ALT (SGPT) 45 (H) U/L      AST  (SGOT) 24 U/L      Alkaline Phosphatase 80 U/L      Total Bilirubin 1.1 mg/dL      eGFR Non African Amer 82 mL/min/1.73      Globulin 3.6 gm/dL      A/G Ratio 1.1 g/dL      BUN/Creatinine Ratio 29.7 (H)     Anion Gap 15.2 mmol/L     Narrative:       The MDRD GFR formula is only valid for adults with stable renal function between ages 18 and 70.    CBC & Differential [[id number]] Collected:  09/01/17 0640    Specimen:  Blood Updated:  09/01/17 0652    Narrative:       The following orders were created for panel order CBC & Differential.  Procedure                               Abnormality         Status                     ---------                               -----------         ------                     CBC Auto Differential[[id number]]        Abnormal            Final result                 Please view results for these tests on the individual orders.    Protime-INR [[id number]]  (Normal) Collected:  09/01/17 0640    Specimen:  Blood Updated:  09/01/17 0702     Protime 13.4 Seconds      INR 1.06    CBC Auto Differential [[id number]]  (Abnormal) Collected:  09/01/17 0640    Specimen:  Blood Updated:  09/01/17 0652     WBC 20.62 (H) 10*3/mm3      RBC 4.63 10*6/mm3      Hemoglobin 14.9 g/dL      Hematocrit 44.2 %      MCV 95.5 fL      MCH 32.2 pg      MCHC 33.7 g/dL      RDW 12.8 %      RDW-SD 44.5 fl      MPV 11.7 fL      Platelets 238 10*3/mm3      Neutrophil % 88.3 (H) %      Lymphocyte % 7.1 (L) %      Monocyte % 4.1 (L) %      Eosinophil % 0.0 (L) %      Basophil % 0.0 %      Immature Grans % 0.5 %      Neutrophils, Absolute 18.19 (H) 10*3/mm3      Lymphocytes, Absolute 1.46 10*3/mm3      Monocytes, Absolute 0.85 10*3/mm3      Eosinophils, Absolute 0.01 10*3/mm3      Basophils, Absolute 0.01 10*3/mm3      Immature Grans, Absolute 0.10 (H) 10*3/mm3     Lipase [[id number]]  (Normal) Collected:  09/01/17 0640    Specimen:  Blood Updated:  09/01/17 0718     Lipase 14 U/L     Urinalysis With / Culture If Indicated  [[id number]] Collected:  09/01/17 0806    Specimen:  Urine from Urine, Clean Catch Updated:  09/01/17 0809    Urinalysis, Microscopic Only [[id number]] Collected:  09/01/17 0806    Specimen:  Urine from Urine, Clean Catch Updated:  09/01/17 0816    Urine Culture [[id number]] Collected:  09/01/17 0806    Specimen:  Urine from Urine, Clean Catch Updated:  09/01/17 0816          I ordered the above labs and reviewed the results    RADIOLOGY  CT Abdomen Pelvis With Contrast    (Results Pending)  CT Abdomen Pelvis:   1) Acute cholecystitis   2) Right Upper Lung Nodule         I ordered the above noted radiological studies. Interpreted by radiologist. Reviewed by me in PACS.       PROCEDURES  Procedures  EKG           EKG time: 0617  Rhythm/Rate: sinus 67  P waves and UT: normal   QRS, axis: normal   ST and T waves: normal     Interpreted Contemporaneously by me, independently viewed  No previous for comparison.      PROGRESS AND CONSULTS  ED Course   0721: Ordered IVF, labs, and CT abdomen/ Pelvis for further evaluation.    0721: Ordered Zofran ad Dilaudid for pain.    0829: Placed consult to Surgery.    0833: Rechecked pt and discussed cholecystectomy. Discussed plan for admission. PT will need to f/u with PCP for lung nodule. Pt understands and agrees to plan. All questions addressed at time.     0840: Discussed pt's case with Dr. Mclean [surgery] who agrees to admit.     MEDICAL DECISION MAKING  Results were reviewed/discussed with the patient and they were also made aware of online access. Pt also made aware that some labs, such as cultures, will not be resulted during ER visit and follow up with PMD is necessary.     MDM  Number of Diagnoses or Management Options     Amount and/or Complexity of Data Reviewed  Clinical lab tests: ordered and reviewed (Protime = 13.4, INR = 1.06, WBC = 20.62, BUN = 27  )  Tests in the radiology section of CPT®: ordered and reviewed (CT Abdomen Pelvis:   1) Acute cholecystitis   2)  Right Upper Lung Nodule )  Tests in the medicine section of CPT®: ordered and reviewed (Refer to procedure )  Discussion of test results with the performing providers: yes (Dr. Mclean (surgery) )           DIAGNOSIS  Final diagnoses:   Acute cholecystitis   Lung nodule       DISPOSITION  ADMISSION    Discussed treatment plan and reason for admission with pt/family and admitting physician.  Pt/family voiced understanding of the plan for admission for further testing/treatment as needed.         Latest Documented Vital Signs:  As of 8:42 AM  BP- 124/75 HR- 75 Temp- 97.2 °F (36.2 °C) O2 sat- 94%    --  Documentation assistance provided by zainab Garza for Dr. Jones.  Information recorded by the scribe was done at my direction and has been verified and validated by me.     Scott Garza  09/01/17 0842       Josh Jones MD  09/01/17 0804

## 2017-09-01 NOTE — OP NOTE
Operative Note :  Crystal Mclean MD      Camden Cleaning  1945    Procedure Date: 09/01/17    Pre-op Diagnosis:  · Acute cholecystitis with cholelithiasis    Post-op Diagnosis:  · Acute cholecystitis with cholelithiasis    Procedure:   · Laparoscopic cholecystectomy with intraoperative cholangiogram    Surgeon: Crystal Mclean MD    Assistant: Lakisha PEDROZA    Anesthesia:  General (general endotracheal tube)    Estimated Blood Loss: 50 cc    Specimens: Gallbladder    Complications: None    Indications:  · Mr. Cleaning is a 72 year-old gentleman with acute cholecystitis and cholelithiasis who was admitted through the emergency room earlier today. He is being brought to the operating room this evening for laparoscopic versus open cholecystectomy. He has been counseled on the risks of the procedure and has consented to proceed.    Findings:   · Very inflamed gallbladder with multiple black tiny gallstones, normal cholangiogram    Description of procedure:  The patient was brought to the operating room and placed on the OR table in supine position.  An endotracheal tube was inserted, and general anesthesia was induced.  His anterior abdominal wall was shaved, prepped, and draped in a sterile fashion.  A surgical timeout was then completed.  A curvilinear infraumbilical skin incision was made after instillation of 0.5% Marcaine with epinephrine.  The umbilical stalk was grasped and elevated, and a longitudinal fasciotomy made.  A Houser trocar was inserted and secured with 2 separate 0 Vicryl stay uterus.  The abdomen was then insufflated.  Under direct visualization, 3 additional 5 mm trochars were then placed within the subxiphoid and subcostal space on the right.  The gallbladder was retracted cephalad and infundibulum retracted inferiorly. The cystic duct and cystic artery were slowly dissected out circumferentially until a firm critical view was obtained.  A single Hemoclip was placed across the cystic duct at  its junction with the gallbladder infundibulum and 3 hemoclips were placed across the cystic artery.  A small hole was created on the anterior wall of the cystic duct, and a cholangiogram catheter inserted through a right upper quadrant angiocatheter.  The catheter was secured within the duct with an additional Hemoclip and a cholangiogram was then obtained.  The cholangiogram showed normal filling of the common bile duct, right and left intrahepatic ducts, and spillage into the duodenum. The common duct was somewhat dilated, but no filling defects were appreciable.  The catheter was then withdrawn and 2 additional hemoclips placed across the cystic duct.  The cystic duct and artery were then transected, leaving 2 clips behind on both of the stumps.  The gallbladder was then slowly dissected off of the surface of the liver using electrocautery and it was removed from the peritoneal cavity within an Endo Catch bag at the umbilical trocar site.  The gallbladder was passed off to pathology.  The abdomen was then reinsufflated and right upper quadrant inspected. The liver edge was fairly friable, so additional electrocautery was used to achieve hemostasis and a piece of Surgicel was placed into the gallbladder fossa.  The right upper quadrant was copiously irrigated and suctioned dry.  All trochars were then removed under direct visualization and the abdomen desufflated.  The umbilical fascial incision was closed using the previously placed 0 Vicryl stay sutures, all skin incisions closed with 4-0 Vicryl subcuticular stitches, and then each was dressed with Dermabond.  The patient was then extubated and transferred to PACU in stable condition.  All counts were correct per nursing.    Crystal Mclean MD  General and Endoscopic Surgery  [facility]    [address], [address]  [place], [place], [address]  P: [phone]  F: [phone]

## 2017-09-01 NOTE — ANESTHESIA PREPROCEDURE EVALUATION
Anesthesia Evaluation     no history of anesthetic complications:  NPO Solid Status: > 6 hours       Airway   Mallampati: II  TM distance: >3 FB  Neck ROM: full  no difficulty expected  Dental - normal exam     Pulmonary - negative pulmonary ROS and normal exam   Cardiovascular   Exercise tolerance: good (4-7 METS)    ECG reviewed  Rhythm: regular    (+) hypertension, hyperlipidemia  (-) murmur      Neuro/Psych- negative ROS  GI/Hepatic/Renal/Endo - negative ROS     Musculoskeletal (-) negative ROS    Abdominal  - normal exam   Substance History      OB/GYN          Other                                        Anesthesia Plan    ASA 2     general   (  D/W R&B of GA including but not limited to: heart, lung, liver, kidney, neurologic problems, positioning injuries, dental damage, corneal abrasion and TMJ.  .)  intravenous induction

## 2017-09-02 LAB
ALBUMIN SERPL-MCNC: 2.9 G/DL (ref 3.5–5.2)
ALBUMIN/GLOB SERPL: 0.9 G/DL
ALP SERPL-CCNC: 120 U/L (ref 39–117)
ALT SERPL W P-5'-P-CCNC: 397 U/L (ref 1–41)
ANION GAP SERPL CALCULATED.3IONS-SCNC: 12.6 MMOL/L
AST SERPL-CCNC: 229 U/L (ref 1–40)
BACTERIA SPEC AEROBE CULT: NO GROWTH
BASOPHILS # BLD AUTO: 0.02 10*3/MM3 (ref 0–0.2)
BASOPHILS NFR BLD AUTO: 0.2 % (ref 0–1.5)
BILIRUB SERPL-MCNC: 1.5 MG/DL (ref 0.1–1.2)
BUN BLD-MCNC: 21 MG/DL (ref 8–23)
BUN/CREAT SERPL: 30.9 (ref 7–25)
CALCIUM SPEC-SCNC: 8.2 MG/DL (ref 8.6–10.5)
CHLORIDE SERPL-SCNC: 100 MMOL/L (ref 98–107)
CO2 SERPL-SCNC: 21.4 MMOL/L (ref 22–29)
CREAT BLD-MCNC: 0.68 MG/DL (ref 0.76–1.27)
DEPRECATED RDW RBC AUTO: 46 FL (ref 37–54)
EOSINOPHIL # BLD AUTO: 0.06 10*3/MM3 (ref 0–0.7)
EOSINOPHIL NFR BLD AUTO: 0.5 % (ref 0.3–6.2)
ERYTHROCYTE [DISTWIDTH] IN BLOOD BY AUTOMATED COUNT: 13 % (ref 11.5–14.5)
GFR SERPL CREATININE-BSD FRML MDRD: 115 ML/MIN/1.73
GLOBULIN UR ELPH-MCNC: 3.2 GM/DL
GLUCOSE BLD-MCNC: 159 MG/DL (ref 65–99)
HCT VFR BLD AUTO: 39.3 % (ref 40.4–52.2)
HGB BLD-MCNC: 12.8 G/DL (ref 13.7–17.6)
IMM GRANULOCYTES # BLD: 0.03 10*3/MM3 (ref 0–0.03)
IMM GRANULOCYTES NFR BLD: 0.3 % (ref 0–0.5)
LYMPHOCYTES # BLD AUTO: 1.78 10*3/MM3 (ref 0.9–4.8)
LYMPHOCYTES NFR BLD AUTO: 15.6 % (ref 19.6–45.3)
MCH RBC QN AUTO: 31.6 PG (ref 27–32.7)
MCHC RBC AUTO-ENTMCNC: 32.6 G/DL (ref 32.6–36.4)
MCV RBC AUTO: 97 FL (ref 79.8–96.2)
MONOCYTES # BLD AUTO: 1.02 10*3/MM3 (ref 0.2–1.2)
MONOCYTES NFR BLD AUTO: 8.9 % (ref 5–12)
NEUTROPHILS # BLD AUTO: 8.53 10*3/MM3 (ref 1.9–8.1)
NEUTROPHILS NFR BLD AUTO: 74.5 % (ref 42.7–76)
PLATELET # BLD AUTO: 199 10*3/MM3 (ref 140–500)
PMV BLD AUTO: 12 FL (ref 6–12)
POTASSIUM BLD-SCNC: 4.3 MMOL/L (ref 3.5–5.2)
PROT SERPL-MCNC: 6.1 G/DL (ref 6–8.5)
RBC # BLD AUTO: 4.05 10*6/MM3 (ref 4.6–6)
SODIUM BLD-SCNC: 134 MMOL/L (ref 136–145)
WBC NRBC COR # BLD: 11.44 10*3/MM3 (ref 4.5–10.7)

## 2017-09-02 PROCEDURE — 25010000002 HYDROMORPHONE PER 4 MG: Performed by: SURGERY

## 2017-09-02 PROCEDURE — 99024 POSTOP FOLLOW-UP VISIT: CPT | Performed by: SURGERY

## 2017-09-02 PROCEDURE — 80053 COMPREHEN METABOLIC PANEL: CPT | Performed by: SURGERY

## 2017-09-02 PROCEDURE — G0378 HOSPITAL OBSERVATION PER HR: HCPCS

## 2017-09-02 PROCEDURE — 85025 COMPLETE CBC W/AUTO DIFF WBC: CPT | Performed by: SURGERY

## 2017-09-02 PROCEDURE — 25010000002 ONDANSETRON PER 1 MG: Performed by: SURGERY

## 2017-09-02 PROCEDURE — 25010000002 PROMETHAZINE PER 50 MG: Performed by: SURGERY

## 2017-09-02 RX ORDER — DEXTROSE, SODIUM CHLORIDE, AND POTASSIUM CHLORIDE 5; .45; .15 G/100ML; G/100ML; G/100ML
75 INJECTION INTRAVENOUS CONTINUOUS
Status: DISCONTINUED | OUTPATIENT
Start: 2017-09-02 | End: 2017-09-03

## 2017-09-02 RX ORDER — HYDROMORPHONE HYDROCHLORIDE 1 MG/ML
0.5 INJECTION, SOLUTION INTRAMUSCULAR; INTRAVENOUS; SUBCUTANEOUS ONCE
Status: COMPLETED | OUTPATIENT
Start: 2017-09-02 | End: 2017-09-02

## 2017-09-02 RX ORDER — PROMETHAZINE HYDROCHLORIDE 25 MG/ML
12.5 INJECTION, SOLUTION INTRAMUSCULAR; INTRAVENOUS EVERY 6 HOURS PRN
Status: DISCONTINUED | OUTPATIENT
Start: 2017-09-02 | End: 2017-09-03

## 2017-09-02 RX ADMIN — HYDROMORPHONE HYDROCHLORIDE 0.5 MG: 1 INJECTION, SOLUTION INTRAMUSCULAR; INTRAVENOUS; SUBCUTANEOUS at 12:55

## 2017-09-02 RX ADMIN — HYDROCHLOROTHIAZIDE 25 MG: 12.5 CAPSULE, GELATIN COATED ORAL at 08:44

## 2017-09-02 RX ADMIN — HYDROMORPHONE HYDROCHLORIDE 0.5 MG: 1 INJECTION, SOLUTION INTRAMUSCULAR; INTRAVENOUS; SUBCUTANEOUS at 08:44

## 2017-09-02 RX ADMIN — ONDANSETRON 4 MG: 2 INJECTION INTRAMUSCULAR; INTRAVENOUS at 10:27

## 2017-09-02 RX ADMIN — ATORVASTATIN CALCIUM 40 MG: 20 TABLET, FILM COATED ORAL at 08:44

## 2017-09-02 RX ADMIN — HYDROMORPHONE HYDROCHLORIDE 0.5 MG: 1 INJECTION, SOLUTION INTRAMUSCULAR; INTRAVENOUS; SUBCUTANEOUS at 20:04

## 2017-09-02 RX ADMIN — POTASSIUM CHLORIDE, DEXTROSE MONOHYDRATE AND SODIUM CHLORIDE 100 ML/HR: 150; 5; 450 INJECTION, SOLUTION INTRAVENOUS at 00:28

## 2017-09-02 RX ADMIN — PROMETHAZINE HYDROCHLORIDE 12.5 MG: 25 INJECTION INTRAMUSCULAR; INTRAVENOUS at 12:55

## 2017-09-02 RX ADMIN — HYDROCODONE BITARTRATE AND ACETAMINOPHEN 1 TABLET: 5; 325 TABLET ORAL at 03:58

## 2017-09-02 RX ADMIN — POTASSIUM CHLORIDE, DEXTROSE MONOHYDRATE AND SODIUM CHLORIDE 75 ML/HR: 150; 5; 450 INJECTION, SOLUTION INTRAVENOUS at 10:31

## 2017-09-02 RX ADMIN — POTASSIUM CHLORIDE, DEXTROSE MONOHYDRATE AND SODIUM CHLORIDE 75 ML/HR: 150; 5; 450 INJECTION, SOLUTION INTRAVENOUS at 14:30

## 2017-09-02 RX ADMIN — HYDROMORPHONE HYDROCHLORIDE 0.5 MG: 1 INJECTION, SOLUTION INTRAMUSCULAR; INTRAVENOUS; SUBCUTANEOUS at 10:27

## 2017-09-02 RX ADMIN — HYDROMORPHONE HYDROCHLORIDE 0.5 MG: 1 INJECTION, SOLUTION INTRAMUSCULAR; INTRAVENOUS; SUBCUTANEOUS at 06:38

## 2017-09-02 NOTE — PLAN OF CARE
Problem: Patient Care Overview (Adult)  Goal: Plan of Care Review  Outcome: Ongoing (interventions implemented as appropriate)    09/02/17 0536   Coping/Psychosocial Response Interventions   Plan Of Care Reviewed With patient   Patient Care Overview   Progress progress towards functional goals is fair   Outcome Evaluation   Outcome Summary/Follow up Plan pt had gall bladder removal last night; feels better now; ambulated once; lortab is given for pain, and one shot of iv dilaudid was needed. He does have sharp pain with deep brathing. 4 lap sites are looking good, anticipating d/c today         Problem: Pain, Acute (Adult)  Goal: Acceptable Pain Control/Comfort Level  Outcome: Ongoing (interventions implemented as appropriate)    Problem: Perioperative Period (Adult)  Goal: Signs and Symptoms of Listed Potential Problems Will be Absent or Manageable (Perioperative Period)  Outcome: Ongoing (interventions implemented as appropriate)

## 2017-09-02 NOTE — PLAN OF CARE
Problem: Patient Care Overview (Adult)  Goal: Plan of Care Review  Outcome: Ongoing (interventions implemented as appropriate)    09/02/17 2140   Coping/Psychosocial Response Interventions   Plan Of Care Reviewed With patient;spouse   Patient Care Overview   Progress declining   Outcome Evaluation   Outcome Summary/Follow up Plan Sharp, intense pain to RUQ this morning-worse with any movement and deep inspiration. Relieved after a couple doses of PRN dilaudid. IV Zofran and Phenergan given for nausea. Pt advanced to regular but has not wanted anything but clears due to nausea. Dilaudid and Phenergan made pt very confused and unsteady, so I placed on bed alarm temporarily. Unable to void after multiple attempts. Bladder scan showed >683, straight cath performed and yielded 550 mL urine. Lap sites c/d/i. Abd distended and is not yet passing gas.        Goal: Adult Individualization and Mutuality  Outcome: Ongoing (interventions implemented as appropriate)    Problem: Pain, Acute (Adult)  Goal: Acceptable Pain Control/Comfort Level  Outcome: Ongoing (interventions implemented as appropriate)    Problem: Perioperative Period (Adult)  Goal: Signs and Symptoms of Listed Potential Problems Will be Absent or Manageable (Perioperative Period)  Outcome: Ongoing (interventions implemented as appropriate)

## 2017-09-02 NOTE — ANESTHESIA POSTPROCEDURE EVALUATION
Patient: Camden Cleaning    Procedure Summary     Date Anesthesia Start Anesthesia Stop Room / Location    09/01/17 [id number]  [facility] 09 /  [facility]       Procedure Diagnosis Surgeon Provider    CHOLECYSTECTOMY LAPAROSCOPIC INTRAOPERATIVE CHOLANGIOGRAM (N/A Abdomen) Acute cholecystitis  (Acute cholecystitis [K81.0]) MD Veronica Auguste MD          Anesthesia Type: general  Last vitals  BP   115/81 (09/01/17 1950)    Temp        Pulse   86 (09/01/17 1950)   Resp   12 (09/01/17 1950)    SpO2   93 % (09/01/17 1950)      Post Anesthesia Care and Evaluation    Patient location during evaluation: bedside  Patient participation: complete - patient participated  Level of consciousness: awake and alert  Pain management: adequate  Airway patency: patent  Anesthetic complications: No anesthetic complications    Cardiovascular status: acceptable  Respiratory status: acceptable  Hydration status: acceptable

## 2017-09-02 NOTE — PROGRESS NOTES
"General Surgery  Progress Note    CC: Follow-up acute cholecystitis with cholelithiasis    POD#1 laparoscopic cholecystectomy        S: Felt OK all night until about ten minutes ago when he began having sharp pains in the RUQ and nausea. Has tolerated clear liquids up until this painful attack. LFTs up in the 200-300s due to significant cauterization of the liver edge during dissection yesterday most likely.    O:/68 (BP Location: Right arm, Patient Position: Lying)  Pulse 81  Temp 99.3 °F (37.4 °C) (Oral)   Resp 16  Ht 67\" (170.2 cm)  Wt 161 lb (73 kg)  SpO2 92%  BMI 25.22 kg/m2  Body mass index is 25.22 kg/(m^2).    Intake & Output (last day)       09/01 0701 - 09/02 0700 09/02 0701 - 09/03 0700    P.O.  210    I.V. (mL/kg) 3698.7 (50.6) 213 (2.9)    Total Intake(mL/kg) 3698.7 (50.6) 423 (5.8)    Urine (mL/kg/hr) 100 (0.1)     Total Output 100      Net +3598.7 +423          Unmeasured Urine Occurrence 1 x             GENERAL: alert, well appearing, and in moderate distress due to abdominal pain  HEENT: normocephalic, atraumatic, moist mucous membranes, clear sclera   CHEST: clear to auscultation, no wheezes, rales or rhonchi, symmetric air entry  CARDIAC: regular rate and rhythm    ABDOMEN: soft, appropriate incisional tenderness, incisions C/D/I with dermabond  EXTREMITIES: no cyanosis, clubbing, or edema   SKIN: Warm and moist, no rashes    LABS      Results from last 7 days  Lab Units 09/02/17  0618 09/01/17  0640   WBC 10*3/mm3 11.44* 20.62*   HEMOGLOBIN g/dL 12.8* 14.9   HEMATOCRIT % 39.3* 44.2   PLATELETS 10*3/mm3 199 238       Results from last 7 days  Lab Units 09/02/17  0618 09/01/17  0640   SODIUM mmol/L 134* 137   POTASSIUM mmol/L 4.3 4.2   CHLORIDE mmol/L 100 99   CO2 mmol/L 21.4* 22.8   BUN mg/dL 21 27*   CREATININE mg/dL 0.68* 0.91   CALCIUM mg/dL 8.2* 9.2   BILIRUBIN mg/dL 1.5* 1.1   ALK PHOS U/L 120* 80   ALT (SGPT) U/L 397* 45*   AST (SGOT) U/L 229* 24   GLUCOSE mg/dL 159* 169* "           A/P: 72 y.o. male POD#1 laparoscopic cholecystectomy with cholangiogram    Continue IVF for now as long as he is feeling nauseous. He is not ready for discharge due to pain and nausea, as well as his elevated LFTs. Recheck CMP tomorrow.    Crystal Mclean MD  General and Endoscopic Surgery  [facility]    [address], [address]  [place], [place], [address]  P: [phone]  F: [phone]

## 2017-09-03 LAB
ALBUMIN SERPL-MCNC: 2.7 G/DL (ref 3.5–5.2)
ALBUMIN/GLOB SERPL: 0.8 G/DL
ALP SERPL-CCNC: 133 U/L (ref 39–117)
ALT SERPL W P-5'-P-CCNC: 284 U/L (ref 1–41)
AMYLASE SERPL-CCNC: 34 U/L (ref 28–100)
ANION GAP SERPL CALCULATED.3IONS-SCNC: 11.1 MMOL/L
AST SERPL-CCNC: 123 U/L (ref 1–40)
BASOPHILS # BLD AUTO: 0.02 10*3/MM3 (ref 0–0.2)
BASOPHILS NFR BLD AUTO: 0.2 % (ref 0–1.5)
BILIRUB SERPL-MCNC: 1.8 MG/DL (ref 0.1–1.2)
BUN BLD-MCNC: 10 MG/DL (ref 8–23)
BUN/CREAT SERPL: 13.7 (ref 7–25)
CALCIUM SPEC-SCNC: 8.8 MG/DL (ref 8.6–10.5)
CHLORIDE SERPL-SCNC: 102 MMOL/L (ref 98–107)
CO2 SERPL-SCNC: 25.9 MMOL/L (ref 22–29)
CREAT BLD-MCNC: 0.73 MG/DL (ref 0.76–1.27)
DEPRECATED RDW RBC AUTO: 44.4 FL (ref 37–54)
EOSINOPHIL # BLD AUTO: 0.17 10*3/MM3 (ref 0–0.7)
EOSINOPHIL NFR BLD AUTO: 1.6 % (ref 0.3–6.2)
ERYTHROCYTE [DISTWIDTH] IN BLOOD BY AUTOMATED COUNT: 12.7 % (ref 11.5–14.5)
GFR SERPL CREATININE-BSD FRML MDRD: 106 ML/MIN/1.73
GLOBULIN UR ELPH-MCNC: 3.5 GM/DL
GLUCOSE BLD-MCNC: 140 MG/DL (ref 65–99)
HCT VFR BLD AUTO: 39.9 % (ref 40.4–52.2)
HGB BLD-MCNC: 13.2 G/DL (ref 13.7–17.6)
IMM GRANULOCYTES # BLD: 0.04 10*3/MM3 (ref 0–0.03)
IMM GRANULOCYTES NFR BLD: 0.4 % (ref 0–0.5)
LIPASE SERPL-CCNC: 13 U/L (ref 13–60)
LYMPHOCYTES # BLD AUTO: 2.19 10*3/MM3 (ref 0.9–4.8)
LYMPHOCYTES NFR BLD AUTO: 21.2 % (ref 19.6–45.3)
MCH RBC QN AUTO: 32 PG (ref 27–32.7)
MCHC RBC AUTO-ENTMCNC: 33.1 G/DL (ref 32.6–36.4)
MCV RBC AUTO: 96.6 FL (ref 79.8–96.2)
MONOCYTES # BLD AUTO: 0.8 10*3/MM3 (ref 0.2–1.2)
MONOCYTES NFR BLD AUTO: 7.7 % (ref 5–12)
NEUTROPHILS # BLD AUTO: 7.12 10*3/MM3 (ref 1.9–8.1)
NEUTROPHILS NFR BLD AUTO: 68.9 % (ref 42.7–76)
PLATELET # BLD AUTO: 181 10*3/MM3 (ref 140–500)
PMV BLD AUTO: 11.6 FL (ref 6–12)
POTASSIUM BLD-SCNC: 4 MMOL/L (ref 3.5–5.2)
PROT SERPL-MCNC: 6.2 G/DL (ref 6–8.5)
RBC # BLD AUTO: 4.13 10*6/MM3 (ref 4.6–6)
SODIUM BLD-SCNC: 139 MMOL/L (ref 136–145)
WBC NRBC COR # BLD: 10.34 10*3/MM3 (ref 4.5–10.7)

## 2017-09-03 PROCEDURE — 25010000002 HYDROMORPHONE PER 4 MG: Performed by: SURGERY

## 2017-09-03 PROCEDURE — 83690 ASSAY OF LIPASE: CPT | Performed by: SURGERY

## 2017-09-03 PROCEDURE — 25010000002 KETOROLAC TROMETHAMINE PER 15 MG: Performed by: SURGERY

## 2017-09-03 PROCEDURE — 80053 COMPREHEN METABOLIC PANEL: CPT | Performed by: SURGERY

## 2017-09-03 PROCEDURE — 85025 COMPLETE CBC W/AUTO DIFF WBC: CPT | Performed by: SURGERY

## 2017-09-03 PROCEDURE — 82150 ASSAY OF AMYLASE: CPT | Performed by: SURGERY

## 2017-09-03 PROCEDURE — 99024 POSTOP FOLLOW-UP VISIT: CPT | Performed by: SURGERY

## 2017-09-03 PROCEDURE — G0378 HOSPITAL OBSERVATION PER HR: HCPCS

## 2017-09-03 RX ORDER — KETOROLAC TROMETHAMINE 30 MG/ML
30 INJECTION, SOLUTION INTRAMUSCULAR; INTRAVENOUS ONCE
Status: COMPLETED | OUTPATIENT
Start: 2017-09-03 | End: 2017-09-03

## 2017-09-03 RX ORDER — HYDROCODONE BITARTRATE AND ACETAMINOPHEN 10; 325 MG/1; MG/1
2 TABLET ORAL EVERY 4 HOURS PRN
Status: DISCONTINUED | OUTPATIENT
Start: 2017-09-03 | End: 2017-09-04

## 2017-09-03 RX ORDER — HYDROMORPHONE HYDROCHLORIDE 1 MG/ML
0.5 INJECTION, SOLUTION INTRAMUSCULAR; INTRAVENOUS; SUBCUTANEOUS EVERY 4 HOURS PRN
Status: DISCONTINUED | OUTPATIENT
Start: 2017-09-03 | End: 2017-09-05 | Stop reason: HOSPADM

## 2017-09-03 RX ORDER — HYDROCODONE BITARTRATE AND ACETAMINOPHEN 5; 325 MG/1; MG/1
1 TABLET ORAL EVERY 4 HOURS PRN
Status: DISCONTINUED | OUTPATIENT
Start: 2017-09-03 | End: 2017-09-03

## 2017-09-03 RX ORDER — NALOXONE HCL 0.4 MG/ML
0.4 VIAL (ML) INJECTION
Status: DISCONTINUED | OUTPATIENT
Start: 2017-09-03 | End: 2017-09-05 | Stop reason: HOSPADM

## 2017-09-03 RX ORDER — HYDROCODONE BITARTRATE AND ACETAMINOPHEN 10; 325 MG/1; MG/1
1 TABLET ORAL EVERY 4 HOURS PRN
Status: DISCONTINUED | OUTPATIENT
Start: 2017-09-03 | End: 2017-09-04

## 2017-09-03 RX ADMIN — KETOROLAC TROMETHAMINE 30 MG: 30 INJECTION, SOLUTION INTRAMUSCULAR at 10:53

## 2017-09-03 RX ADMIN — HYDROCODONE BITARTRATE AND ACETAMINOPHEN 1 TABLET: 10; 325 TABLET ORAL at 10:53

## 2017-09-03 RX ADMIN — HYDROCHLOROTHIAZIDE 25 MG: 12.5 CAPSULE, GELATIN COATED ORAL at 10:02

## 2017-09-03 RX ADMIN — POTASSIUM CHLORIDE, DEXTROSE MONOHYDRATE AND SODIUM CHLORIDE 75 ML/HR: 150; 5; 450 INJECTION, SOLUTION INTRAVENOUS at 05:17

## 2017-09-03 RX ADMIN — HYDROCODONE BITARTRATE AND ACETAMINOPHEN 1 TABLET: 10; 325 TABLET ORAL at 15:46

## 2017-09-03 RX ADMIN — HYDROCODONE BITARTRATE AND ACETAMINOPHEN 2 TABLET: 10; 325 TABLET ORAL at 23:59

## 2017-09-03 RX ADMIN — HYDROMORPHONE HYDROCHLORIDE 0.5 MG: 1 INJECTION, SOLUTION INTRAMUSCULAR; INTRAVENOUS; SUBCUTANEOUS at 07:59

## 2017-09-03 RX ADMIN — HYDROCODONE BITARTRATE AND ACETAMINOPHEN 1 TABLET: 10; 325 TABLET ORAL at 19:57

## 2017-09-03 RX ADMIN — ATORVASTATIN CALCIUM 40 MG: 20 TABLET, FILM COATED ORAL at 10:01

## 2017-09-03 RX ADMIN — HYDROMORPHONE HYDROCHLORIDE 0.5 MG: 1 INJECTION, SOLUTION INTRAMUSCULAR; INTRAVENOUS; SUBCUTANEOUS at 05:17

## 2017-09-03 RX ADMIN — HYDROMORPHONE HYDROCHLORIDE 0.5 MG: 1 INJECTION, SOLUTION INTRAMUSCULAR; INTRAVENOUS; SUBCUTANEOUS at 02:18

## 2017-09-03 NOTE — PROGRESS NOTES
"General Surgery  Progress Note    CC: Follow-up acute cholecystitis with cholelithiasis    POD#2 laparoscopic cholecystectomy with cholangiogram for acute cholecystitis    S: Nausea improved, and he is finally able to eat solid food for breakfast this morning. Pain still an issue, as he is still requiring dilaudid every 3 hours but has not used Norco since yesterday morning. Sitting up at side of bed, but says he feels very weak. AST & ALT improved but TBili up a bit to 1.8. Some confusion and hallucinations yesterday with dilaudid and phenergan together. Also had difficulty voiding, so straight cath was performed once. Now voiding well on his own.    O:/93 (BP Location: Left arm, Patient Position: Lying)  Pulse 76  Temp 98.1 °F (36.7 °C) (Oral)   Resp 16  Ht 67\" (170.2 cm)  Wt 161 lb (73 kg)  SpO2 94%  BMI 25.22 kg/m2  Body mass index is 25.22 kg/(m^2).    Intake & Output:  UOP: 2140 cc/24 hrs      GENERAL: alert, well appearing, and in moderate distress due to abdominal pain  HEENT: normocephalic, atraumatic, moist mucous membranes, clear sclera   CHEST: clear to auscultation, no wheezes, rales or rhonchi, symmetric air entry  CARDIAC: regular rate and rhythm    ABDOMEN: soft, appropriate incisional tenderness, incisions C/D/I with dermabond  EXTREMITIES: no cyanosis, clubbing, or edema   SKIN: Warm and moist, no rashes    LABS      Results from last 7 days  Lab Units 09/03/17  0741 09/02/17 0618 09/01/17  0640   WBC 10*3/mm3 10.34 11.44* 20.62*   HEMOGLOBIN g/dL 13.2* 12.8* 14.9   HEMATOCRIT % 39.9* 39.3* 44.2   PLATELETS 10*3/mm3 181 199 238       Results from last 7 days  Lab Units 09/03/17  0741 09/02/17 0618 09/01/17  0640   SODIUM mmol/L 139 134* 137   POTASSIUM mmol/L 4.0 4.3 4.2   CHLORIDE mmol/L 102 100 99   CO2 mmol/L 25.9 21.4* 22.8   BUN mg/dL 10 21 27*   CREATININE mg/dL 0.73* 0.68* 0.91   CALCIUM mg/dL 8.8 8.2* 9.2   BILIRUBIN mg/dL 1.8* 1.5* 1.1   ALK PHOS U/L 133* 120* 80   ALT " (SGPT) U/L 284* 397* 45*   AST (SGOT) U/L 123* 229* 24   GLUCOSE mg/dL 140* 159* 169*           A/P: 72 y.o. male POD#2 laparoscopic cholecystectomy with cholangiogram    D/C Phenergan due to hallucinations yesterday after it was given  Space out Dilaudid and use Norco more for pain control, as this will be his pain management regimen once home  Keep until tomorrow to monitor pain and recheck LFTs in AM  PT consult for weakness and wife's nervousness about getting him inside their home    Crystal Mclean MD  General and Endoscopic Surgery  [facility]    [address], [address]  [place], [place], [address]  P: [phone]  F: [phone]

## 2017-09-04 ENCOUNTER — APPOINTMENT (OUTPATIENT)
Dept: CT IMAGING | Facility: HOSPITAL | Age: 72
End: 2017-09-04

## 2017-09-04 LAB
ALBUMIN SERPL-MCNC: 2.7 G/DL (ref 3.5–5.2)
ALBUMIN/GLOB SERPL: 0.8 G/DL
ALP SERPL-CCNC: 208 U/L (ref 39–117)
ALT SERPL W P-5'-P-CCNC: 318 U/L (ref 1–41)
ANION GAP SERPL CALCULATED.3IONS-SCNC: 12.6 MMOL/L
AST SERPL-CCNC: 230 U/L (ref 1–40)
BASOPHILS # BLD AUTO: 0.03 10*3/MM3 (ref 0–0.2)
BASOPHILS NFR BLD AUTO: 0.4 % (ref 0–1.5)
BILIRUB SERPL-MCNC: 2 MG/DL (ref 0.1–1.2)
BUN BLD-MCNC: 16 MG/DL (ref 8–23)
BUN/CREAT SERPL: 20.8 (ref 7–25)
CALCIUM SPEC-SCNC: 8.6 MG/DL (ref 8.6–10.5)
CHLORIDE SERPL-SCNC: 100 MMOL/L (ref 98–107)
CO2 SERPL-SCNC: 25.4 MMOL/L (ref 22–29)
CREAT BLD-MCNC: 0.77 MG/DL (ref 0.76–1.27)
DEPRECATED RDW RBC AUTO: 43.4 FL (ref 37–54)
EOSINOPHIL # BLD AUTO: 0.2 10*3/MM3 (ref 0–0.7)
EOSINOPHIL NFR BLD AUTO: 2.5 % (ref 0.3–6.2)
ERYTHROCYTE [DISTWIDTH] IN BLOOD BY AUTOMATED COUNT: 12.5 % (ref 11.5–14.5)
GFR SERPL CREATININE-BSD FRML MDRD: 99 ML/MIN/1.73
GLOBULIN UR ELPH-MCNC: 3.2 GM/DL
GLUCOSE BLD-MCNC: 126 MG/DL (ref 65–99)
HCT VFR BLD AUTO: 37.8 % (ref 40.4–52.2)
HGB BLD-MCNC: 12.7 G/DL (ref 13.7–17.6)
IMM GRANULOCYTES # BLD: 0.03 10*3/MM3 (ref 0–0.03)
IMM GRANULOCYTES NFR BLD: 0.4 % (ref 0–0.5)
LYMPHOCYTES # BLD AUTO: 2.21 10*3/MM3 (ref 0.9–4.8)
LYMPHOCYTES NFR BLD AUTO: 27.5 % (ref 19.6–45.3)
MCH RBC QN AUTO: 32.2 PG (ref 27–32.7)
MCHC RBC AUTO-ENTMCNC: 33.6 G/DL (ref 32.6–36.4)
MCV RBC AUTO: 95.7 FL (ref 79.8–96.2)
MONOCYTES # BLD AUTO: 0.59 10*3/MM3 (ref 0.2–1.2)
MONOCYTES NFR BLD AUTO: 7.3 % (ref 5–12)
NEUTROPHILS # BLD AUTO: 4.98 10*3/MM3 (ref 1.9–8.1)
NEUTROPHILS NFR BLD AUTO: 61.9 % (ref 42.7–76)
PLATELET # BLD AUTO: 169 10*3/MM3 (ref 140–500)
PMV BLD AUTO: 11.7 FL (ref 6–12)
POTASSIUM BLD-SCNC: 3.4 MMOL/L (ref 3.5–5.2)
PROT SERPL-MCNC: 5.9 G/DL (ref 6–8.5)
RBC # BLD AUTO: 3.95 10*6/MM3 (ref 4.6–6)
SODIUM BLD-SCNC: 138 MMOL/L (ref 136–145)
WBC NRBC COR # BLD: 8.04 10*3/MM3 (ref 4.5–10.7)

## 2017-09-04 PROCEDURE — 85025 COMPLETE CBC W/AUTO DIFF WBC: CPT | Performed by: SURGERY

## 2017-09-04 PROCEDURE — 74177 CT ABD & PELVIS W/CONTRAST: CPT

## 2017-09-04 PROCEDURE — 80053 COMPREHEN METABOLIC PANEL: CPT | Performed by: SURGERY

## 2017-09-04 PROCEDURE — 0 IOPAMIDOL 61 % SOLUTION: Performed by: SURGERY

## 2017-09-04 PROCEDURE — G0378 HOSPITAL OBSERVATION PER HR: HCPCS

## 2017-09-04 PROCEDURE — 99024 POSTOP FOLLOW-UP VISIT: CPT | Performed by: SURGERY

## 2017-09-04 RX ORDER — DOCUSATE SODIUM 100 MG/1
100 CAPSULE, LIQUID FILLED ORAL 2 TIMES DAILY
Status: DISCONTINUED | OUTPATIENT
Start: 2017-09-04 | End: 2017-09-05 | Stop reason: HOSPADM

## 2017-09-04 RX ORDER — BISACODYL 10 MG
10 SUPPOSITORY, RECTAL RECTAL DAILY PRN
Status: DISCONTINUED | OUTPATIENT
Start: 2017-09-04 | End: 2017-09-05 | Stop reason: HOSPADM

## 2017-09-04 RX ORDER — HYDROCODONE BITARTRATE AND ACETAMINOPHEN 10; 325 MG/1; MG/1
1 TABLET ORAL EVERY 4 HOURS PRN
Status: DISCONTINUED | OUTPATIENT
Start: 2017-09-04 | End: 2017-09-05 | Stop reason: HOSPADM

## 2017-09-04 RX ADMIN — HYDROCODONE BITARTRATE AND ACETAMINOPHEN 2 TABLET: 10; 325 TABLET ORAL at 09:22

## 2017-09-04 RX ADMIN — DOCUSATE SODIUM 100 MG: 100 CAPSULE, LIQUID FILLED ORAL at 17:56

## 2017-09-04 RX ADMIN — HYDROCHLOROTHIAZIDE 25 MG: 12.5 CAPSULE, GELATIN COATED ORAL at 08:20

## 2017-09-04 RX ADMIN — HYDROCODONE BITARTRATE AND ACETAMINOPHEN 2 TABLET: 10; 325 TABLET ORAL at 05:06

## 2017-09-04 RX ADMIN — HYDROCODONE BITARTRATE AND ACETAMINOPHEN 1 TABLET: 10; 325 TABLET ORAL at 17:18

## 2017-09-04 RX ADMIN — HYDROCODONE BITARTRATE AND ACETAMINOPHEN 1 TABLET: 10; 325 TABLET ORAL at 13:12

## 2017-09-04 RX ADMIN — DOCUSATE SODIUM 100 MG: 100 CAPSULE, LIQUID FILLED ORAL at 13:12

## 2017-09-04 RX ADMIN — ATORVASTATIN CALCIUM 40 MG: 20 TABLET, FILM COATED ORAL at 08:20

## 2017-09-04 RX ADMIN — MAGNESIUM HYDROXIDE 10 ML: 2400 SUSPENSION ORAL at 13:12

## 2017-09-04 RX ADMIN — HYDROCODONE BITARTRATE AND ACETAMINOPHEN 1 TABLET: 10; 325 TABLET ORAL at 21:15

## 2017-09-04 RX ADMIN — IOPAMIDOL 85 ML: 612 INJECTION, SOLUTION INTRAVENOUS at 15:50

## 2017-09-04 NOTE — SIGNIFICANT NOTE
09/04/17 1119   Rehab Treatment   Discipline physical therapist   Rehab Evaluation   Evaluation Not Performed (Pt and spouse report ambulating laps in romero independently.  Reviewed activity recommendations while inpatient and PT POC.  DC PT, no inpatient needs. )

## 2017-09-04 NOTE — PROGRESS NOTES
"General Surgery  Progress Note    CC: Follow-up acute cholecystitis with cholelithiasis    POD#3 laparoscopic cholecystectomy with cholangiogram for acute cholecystitis    S: Nausea resolved. Feeling constipated. LFTs went back up today and bilirubin is up to 2.0. Pain improved as he is using the norco more than the dilaudid now.    O:/86 (BP Location: Left arm, Patient Position: Sitting)  Pulse 73  Temp 97 °F (36.1 °C) (Oral)   Resp 18  Ht 67\" (170.2 cm)  Wt 161 lb (73 kg)  SpO2 92%  BMI 25.22 kg/m2  Body mass index is 25.22 kg/(m^2).    Intake & Output:  UOP: 1420 cc/24 hrs    GENERAL: alert, well appearing, oriented to person/place/time  HEENT: normocephalic, atraumatic, moist mucous membranes, clear sclera   CHEST: clear to auscultation, no wheezes, rales or rhonchi, symmetric air entry  CARDIAC: regular rate and rhythm    ABDOMEN: soft, appropriate incisional tenderness, incisions C/D/I with dermabond  EXTREMITIES: no cyanosis, clubbing, or edema   SKIN: Warm and moist, no rashes, no jaundice    LABS      Results from last 7 days  Lab Units 09/04/17  0534 09/03/17  0741 09/02/17  0618   WBC 10*3/mm3 8.04 10.34 11.44*   HEMOGLOBIN g/dL 12.7* 13.2* 12.8*   HEMATOCRIT % 37.8* 39.9* 39.3*   PLATELETS 10*3/mm3 169 181 199       Results from last 7 days  Lab Units 09/04/17  0534 09/03/17  0741 09/02/17  0618   SODIUM mmol/L 138 139 134*   POTASSIUM mmol/L 3.4* 4.0 4.3   CHLORIDE mmol/L 100 102 100   CO2 mmol/L 25.4 25.9 21.4*   BUN mg/dL 16 10 21   CREATININE mg/dL 0.77 0.73* 0.68*   CALCIUM mg/dL 8.6 8.8 8.2*   BILIRUBIN mg/dL 2.0* 1.8* 1.5*   ALK PHOS U/L 208* 133* 120*   ALT (SGPT) U/L 318* 284* 397*   AST (SGOT) U/L 230* 123* 229*   GLUCOSE mg/dL 126* 140* 159*           A/P: 72 y.o. male POD#3 laparoscopic cholecystectomy with cholangiogram    Check CT abdomen/pelvis today with IV contrast to evaluate for the source of his persistently elevated liver enzymes and bilirubin.    Crystal Mclean, " MD  General and Endoscopic Surgery  [facility]    [address], [address]  [place], [place], [address]  P: [phone]  F: [phone]

## 2017-09-04 NOTE — CONSULTS
Wife approached  in the romero and requested my visit.  Patient welcomed  and welcomed prayer. Patient expressed gratitude for his family and support network.    Chaplain Leighann Read

## 2017-09-04 NOTE — PLAN OF CARE
Problem: Patient Care Overview (Adult)  Goal: Plan of Care Review  Outcome: Ongoing (interventions implemented as appropriate)    09/04/17 0541   Coping/Psychosocial Response Interventions   Plan Of Care Reviewed With patient   Patient Care Overview   Progress improving   Outcome Evaluation   Outcome Summary/Follow up Plan Lortab 2 tabs providing better pain control. Pt was able to sleep better after getting 2 tabs. Encourage to continue to ambulate d/t gas pain. Ambulate romero x 2 and walking around in room. Passing flatulance but no BM          Problem: Pain, Acute (Adult)  Goal: Acceptable Pain Control/Comfort Level  Outcome: Ongoing (interventions implemented as appropriate)    Problem: Perioperative Period (Adult)  Goal: Signs and Symptoms of Listed Potential Problems Will be Absent or Manageable (Perioperative Period)  Outcome: Outcome(s) achieved Date Met:  09/04/17

## 2017-09-04 NOTE — PLAN OF CARE
Problem: Patient Care Overview (Adult)  Goal: Plan of Care Review  Outcome: Ongoing (interventions implemented as appropriate)    09/04/17 3516   Coping/Psychosocial Response Interventions   Plan Of Care Reviewed With patient;spouse   Patient Care Overview   Progress improving   Outcome Evaluation   Outcome Summary/Follow up Plan Active bowel sounds to all quads and passing gas. MOM and Colace given to help move bowels. Pain better overall, but remains sharp to RUQ fairly constantly. Controlled with Lortab 10 today. Ambulating frequently in halls. CT Scan done due to elevated LFTs today.        Goal: Adult Individualization and Mutuality  Outcome: Ongoing (interventions implemented as appropriate)    Problem: Pain, Acute (Adult)  Goal: Acceptable Pain Control/Comfort Level  Outcome: Ongoing (interventions implemented as appropriate)

## 2017-09-05 VITALS
HEART RATE: 70 BPM | RESPIRATION RATE: 16 BRPM | SYSTOLIC BLOOD PRESSURE: 124 MMHG | HEIGHT: 67 IN | OXYGEN SATURATION: 93 % | TEMPERATURE: 98.5 F | DIASTOLIC BLOOD PRESSURE: 76 MMHG | WEIGHT: 161 LBS | BODY MASS INDEX: 25.27 KG/M2

## 2017-09-05 LAB
ALBUMIN SERPL-MCNC: 2.9 G/DL (ref 3.5–5.2)
ALBUMIN/GLOB SERPL: 0.8 G/DL
ALP SERPL-CCNC: 198 U/L (ref 39–117)
ALT SERPL W P-5'-P-CCNC: 248 U/L (ref 1–41)
ANION GAP SERPL CALCULATED.3IONS-SCNC: 14.7 MMOL/L
AST SERPL-CCNC: 97 U/L (ref 1–40)
BASOPHILS # BLD AUTO: 0.01 10*3/MM3 (ref 0–0.2)
BASOPHILS NFR BLD AUTO: 0.1 % (ref 0–1.5)
BILIRUB SERPL-MCNC: 1.6 MG/DL (ref 0.1–1.2)
BUN BLD-MCNC: 19 MG/DL (ref 8–23)
BUN/CREAT SERPL: 25 (ref 7–25)
CALCIUM SPEC-SCNC: 8.8 MG/DL (ref 8.6–10.5)
CHLORIDE SERPL-SCNC: 96 MMOL/L (ref 98–107)
CO2 SERPL-SCNC: 27.3 MMOL/L (ref 22–29)
CREAT BLD-MCNC: 0.76 MG/DL (ref 0.76–1.27)
CYTO UR: NORMAL
DEPRECATED RDW RBC AUTO: 43 FL (ref 37–54)
EOSINOPHIL # BLD AUTO: 0.2 10*3/MM3 (ref 0–0.7)
EOSINOPHIL NFR BLD AUTO: 2.3 % (ref 0.3–6.2)
ERYTHROCYTE [DISTWIDTH] IN BLOOD BY AUTOMATED COUNT: 12.6 % (ref 11.5–14.5)
GFR SERPL CREATININE-BSD FRML MDRD: 101 ML/MIN/1.73
GLOBULIN UR ELPH-MCNC: 3.8 GM/DL
GLUCOSE BLD-MCNC: 134 MG/DL (ref 65–99)
HCT VFR BLD AUTO: 37.6 % (ref 40.4–52.2)
HGB BLD-MCNC: 13.4 G/DL (ref 13.7–17.6)
IMM GRANULOCYTES # BLD: 0.03 10*3/MM3 (ref 0–0.03)
IMM GRANULOCYTES NFR BLD: 0.3 % (ref 0–0.5)
LAB AP CASE REPORT: NORMAL
LYMPHOCYTES # BLD AUTO: 1.76 10*3/MM3 (ref 0.9–4.8)
LYMPHOCYTES NFR BLD AUTO: 20.3 % (ref 19.6–45.3)
Lab: NORMAL
MCH RBC QN AUTO: 33.5 PG (ref 27–32.7)
MCHC RBC AUTO-ENTMCNC: 35.6 G/DL (ref 32.6–36.4)
MCV RBC AUTO: 94 FL (ref 79.8–96.2)
MONOCYTES # BLD AUTO: 0.61 10*3/MM3 (ref 0.2–1.2)
MONOCYTES NFR BLD AUTO: 7.1 % (ref 5–12)
NEUTROPHILS # BLD AUTO: 6.04 10*3/MM3 (ref 1.9–8.1)
NEUTROPHILS NFR BLD AUTO: 69.9 % (ref 42.7–76)
PATH REPORT.FINAL DX SPEC: NORMAL
PATH REPORT.GROSS SPEC: NORMAL
PLATELET # BLD AUTO: 201 10*3/MM3 (ref 140–500)
PMV BLD AUTO: 11.8 FL (ref 6–12)
POTASSIUM BLD-SCNC: 3.8 MMOL/L (ref 3.5–5.2)
PROT SERPL-MCNC: 6.7 G/DL (ref 6–8.5)
RBC # BLD AUTO: 4 10*6/MM3 (ref 4.6–6)
SODIUM BLD-SCNC: 138 MMOL/L (ref 136–145)
WBC NRBC COR # BLD: 8.65 10*3/MM3 (ref 4.5–10.7)

## 2017-09-05 PROCEDURE — G0378 HOSPITAL OBSERVATION PER HR: HCPCS

## 2017-09-05 PROCEDURE — 85025 COMPLETE CBC W/AUTO DIFF WBC: CPT | Performed by: SURGERY

## 2017-09-05 PROCEDURE — 80053 COMPREHEN METABOLIC PANEL: CPT | Performed by: SURGERY

## 2017-09-05 RX ORDER — HYDROCODONE BITARTRATE AND ACETAMINOPHEN 10; 325 MG/1; MG/1
1 TABLET ORAL EVERY 4 HOURS PRN
Qty: 45 TABLET | Refills: 0 | Status: SHIPPED | OUTPATIENT
Start: 2017-09-05 | End: 2017-09-22

## 2017-09-05 RX ADMIN — ATORVASTATIN CALCIUM 40 MG: 20 TABLET, FILM COATED ORAL at 08:11

## 2017-09-05 RX ADMIN — HYDROCODONE BITARTRATE AND ACETAMINOPHEN 1 TABLET: 10; 325 TABLET ORAL at 04:00

## 2017-09-05 RX ADMIN — DOCUSATE SODIUM 100 MG: 100 CAPSULE, LIQUID FILLED ORAL at 08:11

## 2017-09-05 RX ADMIN — HYDROCODONE BITARTRATE AND ACETAMINOPHEN 1 TABLET: 10; 325 TABLET ORAL at 01:20

## 2017-09-05 RX ADMIN — HYDROCODONE BITARTRATE AND ACETAMINOPHEN 1 TABLET: 10; 325 TABLET ORAL at 05:18

## 2017-09-05 RX ADMIN — HYDROCHLOROTHIAZIDE 25 MG: 12.5 CAPSULE, GELATIN COATED ORAL at 08:11

## 2017-09-05 RX ADMIN — MAGNESIUM HYDROXIDE 10 ML: 2400 SUSPENSION ORAL at 08:11

## 2017-09-05 RX ADMIN — HYDROCODONE BITARTRATE AND ACETAMINOPHEN 1 TABLET: 10; 325 TABLET ORAL at 09:44

## 2017-09-05 NOTE — DISCHARGE SUMMARY
Discharge Summary    Patient name: Camden Cleaning    Medical record number: [id number]    Admission date: 9/1/2017  Discharge date:  9/5/2017    Attending physician: Crystal Mclean MD    Primary care physician: Sidney Moreno MD    Referring physician: Crystal Mclean MD  [address] [facility] [address] [address]    Consulting physician(s): None    Condition on discharge: improving    Admitting diagnosis:   Patient Active Problem List   Diagnosis   • Calculus of gallbladder with acute cholecystitis without obstruction       Final diagnosis: Acute cholecystitis with cholelithiasis    Procedures: Laparoscopic cholecystectomy with intraoperative cholangiogram performed 9/1/2017    History of present illness: The patient is a  72 y.o. male that was admitted to the hospital with abdominal pain of proximal 4 days duration and was found to have signs of acute cholecystitis on CT scan.  He was admitted from the emergency room for further observation and management..    Hospital course: He was admitted to a [facility] floor for observation and kept nothing by mouth.  Later that day he was taken to the operating room for a laparoscopic cholecystectomy with cholangiogram, twhich showed no filling defects within the bile ducts.  Postoperative, he initially felt very well the morning of postoperative day #1, but by that afternoon began experiencing worsening right quadrant pain and nausea.  His AST & ALT initially went up to the mid 200-300s due to the severity of his cholecystitis requiring a significant amount of electrocautery for hemostasis. His LFTs came down a bit on the morning of postoperative day 2 and his nausea had improved, but he was still having a moderate amount of pain and was requiring IV pain medicine still to that point.  On the morning of postoperative day #3, his AST and ALT increased back up to the 200-300 range, and his bilirubin was continuing to rise.  For this reason, a CT of the abdomen  and pelvis with IV contrast was performed to rule out any postoperative bile leak versus right hepatic artery injury.  Luckily, the CT scan was unremarkable other than showing what was dictated as a possible developing abscess within the gallbladder fossa that per my interpretation of the imaging actually represented a small piece of Surgicel that had been placed within the gallbladder fossa at the time of his surgery.  The following morning, he felt substantially better and his LFTs were declining again towards normal.  It was thought he was safe for discharge, and he is being discharged home on by mouth pain medicine for his persistent but mild right upper quadrant abdominal pain.    Discharge medications:    Camden Cleaning   Home Medication Instructions TERESA:[id number]    Printed on:09/05/17 5577   Medication Information                      Atorvastatin Calcium (LIPITOR PO)  Take 40 mg by mouth Daily.             CRANBERRY, VACC OXYCOCCUS, PO  Take  by mouth.             enalapril (VASOTEC) 20 MG tablet  Take 20 mg by mouth Daily.             HYDROCHLOROTHIAZIDE PO  Take 25 mg by mouth Daily.             HYDROcodone-acetaminophen (NORCO)  MG per tablet  Take 1 tablet by mouth Every 4 (Four) Hours As Needed (pain).             METFORMIN HCL PO  Take 750 mg by mouth Daily With Dinner.             Phenazopyridine HCl 97.5 MG tablet  Take  by mouth.             UNKNOWN TO PATIENT  Unknown blood pressure med                 Discharge instructions:    - Resume regular diet as tolerated  - No heavy lifting of more than 15 lb for 6 weeks. Can start doing aerobic type exercise in 4 weeks.   - No driving while taking narcotic pain medications  - You may resume showering, no tub bathing for two weeks while your incisions heal.  - Wash your incisions with soap and water daily in the shower, pat dry, and leave open to air.    Follow-up appointment: Follow up with Crystal Mclean MD in the office in 2 weeks. Call for  appointment at [phone].    CODE STATUS: Full code

## 2017-09-05 NOTE — PROGRESS NOTES
Discharge Planning Assessment  [facility]     Patient Name: Camden Cleaning  MRN: [id number]  Today's Date: 9/5/2017    Admit Date: 9/1/2017          Discharge Needs Assessment       09/05/17 1114    Living Environment    Lives With spouse    Living Arrangements house    Home Accessibility no concerns    Stair Railings at Home none    Type of Financial/Environmental Concern none    Transportation Available car;family or friend will provide    Discharge Needs Assessment    Concerns To Be Addressed no discharge needs identified;denies needs/concerns at this time    Equipment Currently Used at Home none            Discharge Plan       09/05/17 1115    Case Management/Social Work Plan    Plan Home with wife    Additional Comments Spoke with pt  and wife  for screening of D/C plan/needs.    Confirmed pt's facesheet information as correct.   Pt reports that he has been up walking around nurses station totally independent and does not  feel he will have any needs upon D/C.   Pt stated that his plan will be to return home with his wife with no anticipated needs.   Awaiting MD to round and discuss results of pt's CT scan from 9/4.          Discharge Placement     No information found                Demographic Summary       09/05/17 1113    Referral Information    Admission Type observation    Arrived From home or self-care    Referral Source admission list    Reason For Consult discharge planning            Functional Status       09/05/17 1114    Functional Status Current    Ambulation 0-->independent    Transferring 0-->independent    Toileting 0-->independent    Bathing 0-->independent    Dressing 0-->independent    Eating 0-->independent    Communication 0-->understands/communicates without difficulty    Swallowing (if score 2 or more for any item, consult Rehab Services) 0-->swallows foods/liquids without difficulty    Change in Functional Status Since Onset of Current Illness/Injury no    IADL    Medications independent     Meal Preparation independent    Housekeeping independent    Laundry independent    Shopping independent    Oral Care independent            Psychosocial     None            Abuse/Neglect     None            Legal     None            Substance Abuse     None            Patient Forms     None          Joan Carrillo MSW

## 2017-09-05 NOTE — PLAN OF CARE
Problem: Patient Care Overview (Adult)  Goal: Plan of Care Review  Outcome: Ongoing (interventions implemented as appropriate)    09/05/17 0522   Coping/Psychosocial Response Interventions   Plan Of Care Reviewed With patient   Patient Care Overview   Progress improving   Outcome Evaluation   Outcome Summary/Follow up Plan No BM but cont to pass flatulance. Pt ambulating when not asleep. Cont to c/o RUQ tenderness which is better managed today with Lortab 10. Pt states he feels much better. No c/o nausea. Hoping to go home today.         Problem: Pain, Acute (Adult)  Goal: Acceptable Pain Control/Comfort Level  Outcome: Ongoing (interventions implemented as appropriate)

## 2017-09-05 NOTE — PROGRESS NOTES
Case Management Discharge Note    Final Note: Home    Discharge Placement     No information found        Other: Other (Car)    Discharge Codes: 01  Discharge to home

## 2017-09-13 ENCOUNTER — TELEPHONE (OUTPATIENT)
Dept: SURGERY | Facility: CLINIC | Age: 72
End: 2017-09-13

## 2017-09-13 NOTE — TELEPHONE ENCOUNTER
Patient's wife calling with concern that her  has no energy, no appetite, doesn't feel well overall, has pain when inhaling and is hearing a clicking noise when he breathes s/p lap lucina.  I informed her that it is normal to experience fatigue and lack of appetite after a surgery. I told to her encourage her  to eat small, bland meals and to drink more water. This hopefully will not only help fill his stomach, but give him some energy-this is probably why he doesn't feel well.  The pain when inhaling are likely caused by his dissolvable sutures that a pulling tight. She was informed that when these pull tight during a sudden movement of the abdominal wall there can be a painful pulling sensation.   The clicking noise would not be affiliated with his surgery. This is likely a residual effect from his Bronchitis prior to surgery.

## 2017-09-22 ENCOUNTER — OFFICE VISIT (OUTPATIENT)
Dept: SURGERY | Facility: CLINIC | Age: 72
End: 2017-09-22

## 2017-09-22 DIAGNOSIS — K81.9 CHOLECYSTITIS: Primary | ICD-10-CM

## 2017-09-22 PROCEDURE — 99024 POSTOP FOLLOW-UP VISIT: CPT | Performed by: SURGERY

## 2017-09-22 RX ORDER — SULFAMETHOXAZOLE AND TRIMETHOPRIM 800; 160 MG/1; MG/1
1 TABLET ORAL 2 TIMES DAILY
Qty: 14 TABLET | Refills: 0 | Status: SHIPPED | OUTPATIENT
Start: 2017-09-22 | End: 2017-09-29

## 2017-09-29 NOTE — PROGRESS NOTES
"CHIEF COMPLAINT:   Chief Complaint   Patient presents with   • Post-op     Laparoscopic cholecystectomy with intraoperative cholangiogram 9/1/17       HISTORY OF PRESENT ILLNESS:  This is a 72 y.o. male who presents for a post-operative visit after undergoing a laparoscopic cholecystectomy with [facility] on 9/1/2017.  He was kept postoperatively due to persistent nausea and abdominal pain for about 3 days following his cholecystectomy.  He returns to see me today and denies any fevers, chills, diarrhea, nausea, vomiting, jaundice, or scleral icterus.  His only complaint today is that he has no appetite and is very concerned because he used to \"eat like a horse\".  Just prior to his cholecystectomy, he had been treated for a urinary tract infection as well as bronchitis, and states that in the last week he has had a recurrent cough and some burning with urination.      Pathology:   GALLBLADDER, CHOLECYSTECTOMY:  MODERATE TO SEVERE ACUTE AND CHRONIC CHOLECYSTITIS.  BILIARY SEDIMENT PRESENT, BUT NO WELL-FORMED CALCULI ARE IDENTIFIED.     PHYSICAL EXAM:  Lungs: Clear  Heart: RRR  ABD: Incisions are healing well without any erythema or signs of infection.  Ext: no significant edema, calves nontender    A/P:  This is a 72 y.o. male patient who is S/P laparoscopically cholecystectomy with cholangiogram for severe acute cholecystitis.  I advised him that his anorexia and fatigue are likely all still sequela from his cholecystectomy.  I did perform a CT of the abdomen and pelvis the day prior to his discharge home, to ensure that there were no postoperative complications given his postoperative pain that was out of proportion to what I would've expected for a laparoscopic cholecystectomy.  That CT scan was unremarkable.  I have written a prescription for Bactrim for him to take for the next 7 days to treat his bronchitis and urinary tract infection.  I would like for him to come back and see me in about 4 weeks for a recheck to " ensure that his appetite and fatigue level are improving.  I also advised him that he needs to begin pushing himself to increase his activity levels despite the fatigue.    Crystal cMlean MD  General and Endoscopic Surgery  [facility]    [address], [address]  [place], [place], [address]  P: [phone]  F: [phone]

## 2017-10-27 ENCOUNTER — OFFICE VISIT (OUTPATIENT)
Dept: SURGERY | Facility: CLINIC | Age: 72
End: 2017-10-27

## 2017-10-27 VITALS — WEIGHT: 168.2 LBS | HEART RATE: 87 BPM | OXYGEN SATURATION: 96 % | HEIGHT: 67 IN | BODY MASS INDEX: 26.4 KG/M2

## 2017-10-27 DIAGNOSIS — K80.00 CALCULUS OF GALLBLADDER WITH ACUTE CHOLECYSTITIS WITHOUT OBSTRUCTION: ICD-10-CM

## 2017-10-27 DIAGNOSIS — K81.9 CHOLECYSTITIS: Primary | ICD-10-CM

## 2017-10-27 PROCEDURE — 99024 POSTOP FOLLOW-UP VISIT: CPT | Performed by: SURGERY

## 2017-11-06 NOTE — PROGRESS NOTES
"CHIEF COMPLAINT:   Chief Complaint   Patient presents with   • Post-op Follow-up     4 week f/u to recheck patients appetite & fatigue level have returned. S/P Laparoscopic Cholecystectomy w/ Intraoperative Cholangiogram on 09/01/2017       HISTORY OF PRESENT ILLNESS:  This is a 72 y.o. male who presents for a post-operative visit after undergoing a laparoscopic cholecystectomy with [facility] on 9/1/2017.  I saw him 2 weeks after his discharge from the hospital, and at that time he complained of significant fatigue and lack of appetite.  Today, he returns to see me and states that he is completely back to normal with a healthy appetite and good energy level.  He and his wife just returned from a vacation to [place] where he \"ate like a horse\".  He has had no issues with diarrhea, jaundice, scleral icterus, fevers, or chills.      Pathology:   GALLBLADDER, CHOLECYSTECTOMY:  MODERATE TO SEVERE ACUTE AND CHRONIC CHOLECYSTITIS.  BILIARY SEDIMENT PRESENT, BUT NO WELL-FORMED CALCULI ARE IDENTIFIED.     PHYSICAL EXAM:  Lungs: Clear  Heart: RRR  ABD: Incisions are healing well without any erythema or signs of infection.  Ext: no significant edema, calves nontender    A/P:  This is a 72 y.o. male patient who is S/P laparoscopically cholecystectomy with cholangiogram for severe acute cholecystitis.  He has now returned to his normal baseline and can follow-up with me as needed.    Crystal Mclean MD  General and Endoscopic Surgery  [facility] [place]    [address], [address]  [place], [place], [address]  P: [phone]  F: [phone]     "

## 2017-12-27 ENCOUNTER — APPOINTMENT (OUTPATIENT)
Dept: GENERAL RADIOLOGY | Facility: HOSPITAL | Age: 72
End: 2017-12-27

## 2017-12-27 ENCOUNTER — HOSPITAL ENCOUNTER (EMERGENCY)
Facility: HOSPITAL | Age: 72
Discharge: HOME | End: 2017-12-27
Attending: EMERGENCY MEDICINE | Admitting: EMERGENCY MEDICINE

## 2017-12-27 VITALS
BODY MASS INDEX: 25.11 KG/M2 | WEIGHT: 160 LBS | OXYGEN SATURATION: 97 % | TEMPERATURE: 98.7 F | SYSTOLIC BLOOD PRESSURE: 151 MMHG | HEART RATE: 79 BPM | RESPIRATION RATE: 18 BRPM | HEIGHT: 67 IN | DIASTOLIC BLOOD PRESSURE: 95 MMHG

## 2017-12-27 DIAGNOSIS — S39.012A STRAIN OF LUMBAR REGION, INITIAL ENCOUNTER: ICD-10-CM
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
## 2017-12-27 DIAGNOSIS — S16.1XXA STRAIN OF NECK MUSCLE, INITIAL ENCOUNTER: Primary | ICD-10-CM

## 2017-12-27 PROCEDURE — 72050 X-RAY EXAM NECK SPINE 4/5VWS: CPT

## 2017-12-27 PROCEDURE — 72072 X-RAY EXAM THORAC SPINE 3VWS: CPT

## 2017-12-27 PROCEDURE — 72110 X-RAY EXAM L-2 SPINE 4/>VWS: CPT

## 2017-12-27 PROCEDURE — 99283 EMERGENCY DEPT VISIT LOW MDM: CPT

## 2017-12-27 RX ORDER — BACLOFEN 10 MG/1
10 TABLET ORAL ONCE
Status: COMPLETED | OUTPATIENT
Start: 2017-12-27 | End: 2017-12-27

## 2017-12-27 RX ORDER — BACLOFEN 10 MG/1
10 TABLET ORAL 3 TIMES DAILY PRN
Qty: 15 TABLET | Refills: 0 | Status: SHIPPED | OUTPATIENT
Start: 2017-12-27

## 2017-12-27 RX ADMIN — BACLOFEN 10 MG: 10 TABLET ORAL at 21:17

## 2018-12-27 ENCOUNTER — HOSPITAL ENCOUNTER (EMERGENCY)
Facility: HOSPITAL | Age: 73
Discharge: HOME | End: 2018-12-27
Attending: EMERGENCY MEDICINE | Admitting: EMERGENCY MEDICINE

## 2018-12-27 ENCOUNTER — APPOINTMENT (OUTPATIENT)
Dept: CT IMAGING | Facility: HOSPITAL | Age: 73
End: 2018-12-27

## 2018-12-27 VITALS
HEART RATE: 93 BPM | WEIGHT: 179.2 LBS | TEMPERATURE: 98.3 F | BODY MASS INDEX: 28.12 KG/M2 | RESPIRATION RATE: 16 BRPM | DIASTOLIC BLOOD PRESSURE: 80 MMHG | SYSTOLIC BLOOD PRESSURE: 123 MMHG | HEIGHT: 67 IN | OXYGEN SATURATION: 93 %

## 2018-12-27 DIAGNOSIS — R10.31 RIGHT LOWER QUADRANT ABDOMINAL PAIN: ICD-10-CM

## 2018-12-27 DIAGNOSIS — N20.0 KIDNEY STONE ON RIGHT SIDE: Primary | ICD-10-CM

## 2018-12-27 LAB
ALBUMIN SERPL-MCNC: 4.2 G/DL (ref 3.5–5.2)
ALBUMIN/GLOB SERPL: 1.2 G/DL
ALP SERPL-CCNC: 93 U/L (ref 39–117)
ALT SERPL W P-5'-P-CCNC: 56 U/L (ref 1–41)
ANION GAP SERPL CALCULATED.3IONS-SCNC: 16 MMOL/L
AST SERPL-CCNC: 39 U/L (ref 1–40)
BACTERIA UR QL AUTO: ABNORMAL /HPF
BASOPHILS # BLD AUTO: 0.02 10*3/MM3 (ref 0–0.2)
BASOPHILS NFR BLD AUTO: 0.2 % (ref 0–1.5)
BILIRUB SERPL-MCNC: 1.2 MG/DL (ref 0.1–1.2)
BILIRUB UR QL STRIP: NEGATIVE
BUN BLD-MCNC: 20 MG/DL (ref 8–23)
BUN/CREAT SERPL: 26.7 (ref 7–25)
CALCIUM SPEC-SCNC: 9.8 MG/DL (ref 8.6–10.5)
CHLORIDE SERPL-SCNC: 104 MMOL/L (ref 98–107)
CLARITY UR: CLEAR
CO2 SERPL-SCNC: 22 MMOL/L (ref 22–29)
COLOR UR: YELLOW
CREAT BLD-MCNC: 0.75 MG/DL (ref 0.76–1.27)
DEPRECATED RDW RBC AUTO: 41.9 FL (ref 37–54)
EOSINOPHIL # BLD AUTO: 0.05 10*3/MM3 (ref 0–0.7)
EOSINOPHIL NFR BLD AUTO: 0.4 % (ref 0.3–6.2)
ERYTHROCYTE [DISTWIDTH] IN BLOOD BY AUTOMATED COUNT: 12.6 % (ref 11.5–14.5)
GFR SERPL CREATININE-BSD FRML MDRD: 102 ML/MIN/1.73
GLOBULIN UR ELPH-MCNC: 3.4 GM/DL
GLUCOSE BLD-MCNC: 127 MG/DL (ref 65–99)
GLUCOSE UR STRIP-MCNC: NEGATIVE MG/DL
HCT VFR BLD AUTO: 44.6 % (ref 40.4–52.2)
HGB BLD-MCNC: 15.7 G/DL (ref 13.7–17.6)
HGB UR QL STRIP.AUTO: ABNORMAL
HOLD SPECIMEN: NORMAL
HOLD SPECIMEN: NORMAL
HYALINE CASTS UR QL AUTO: ABNORMAL /LPF
IMM GRANULOCYTES # BLD AUTO: 0.03 10*3/MM3 (ref 0–0.03)
IMM GRANULOCYTES NFR BLD AUTO: 0.3 % (ref 0–0.5)
KETONES UR QL STRIP: ABNORMAL
LEUKOCYTE ESTERASE UR QL STRIP.AUTO: NEGATIVE
LIPASE SERPL-CCNC: 16 U/L (ref 13–60)
LYMPHOCYTES # BLD AUTO: 2.13 10*3/MM3 (ref 0.9–4.8)
LYMPHOCYTES NFR BLD AUTO: 19 % (ref 19.6–45.3)
MCH RBC QN AUTO: 32 PG (ref 27–32.7)
MCHC RBC AUTO-ENTMCNC: 35.2 G/DL (ref 32.6–36.4)
MCV RBC AUTO: 91 FL (ref 79.8–96.2)
MONOCYTES # BLD AUTO: 0.81 10*3/MM3 (ref 0.2–1.2)
MONOCYTES NFR BLD AUTO: 7.2 % (ref 5–12)
NEUTROPHILS # BLD AUTO: 8.19 10*3/MM3 (ref 1.9–8.1)
NEUTROPHILS NFR BLD AUTO: 73.2 % (ref 42.7–76)
NITRITE UR QL STRIP: NEGATIVE
PH UR STRIP.AUTO: <=5 [PH] (ref 5–8)
PLATELET # BLD AUTO: 164 10*3/MM3 (ref 140–500)
PMV BLD AUTO: 12.9 FL (ref 6–12)
POTASSIUM BLD-SCNC: 3.6 MMOL/L (ref 3.5–5.2)
PROT SERPL-MCNC: 7.6 G/DL (ref 6–8.5)
PROT UR QL STRIP: NEGATIVE
RBC # BLD AUTO: 4.9 10*6/MM3 (ref 4.6–6)
RBC # UR: ABNORMAL /HPF
REF LAB TEST METHOD: ABNORMAL
SODIUM BLD-SCNC: 142 MMOL/L (ref 136–145)
SP GR UR STRIP: 1.02 (ref 1–1.03)
SQUAMOUS #/AREA URNS HPF: ABNORMAL /HPF
UROBILINOGEN UR QL STRIP: ABNORMAL
WBC NRBC COR # BLD: 11.2 10*3/MM3 (ref 4.5–10.7)
WBC UR QL AUTO: ABNORMAL /HPF
WHOLE BLOOD HOLD SPECIMEN: NORMAL
WHOLE BLOOD HOLD SPECIMEN: NORMAL

## 2018-12-27 PROCEDURE — 99284 EMERGENCY DEPT VISIT MOD MDM: CPT

## 2018-12-27 PROCEDURE — 83690 ASSAY OF LIPASE: CPT | Performed by: NURSE PRACTITIONER

## 2018-12-27 PROCEDURE — 85025 COMPLETE CBC W/AUTO DIFF WBC: CPT | Performed by: NURSE PRACTITIONER

## 2018-12-27 PROCEDURE — 87086 URINE CULTURE/COLONY COUNT: CPT | Performed by: EMERGENCY MEDICINE

## 2018-12-27 PROCEDURE — 25010000002 ONDANSETRON PER 1 MG: Performed by: NURSE PRACTITIONER

## 2018-12-27 PROCEDURE — 25010000002 HYDROMORPHONE PER 4 MG: Performed by: NURSE PRACTITIONER

## 2018-12-27 PROCEDURE — 74177 CT ABD & PELVIS W/CONTRAST: CPT

## 2018-12-27 PROCEDURE — 96375 TX/PRO/DX INJ NEW DRUG ADDON: CPT

## 2018-12-27 PROCEDURE — 25010000002 IOPAMIDOL 61 % SOLUTION: Performed by: NURSE PRACTITIONER

## 2018-12-27 PROCEDURE — 96361 HYDRATE IV INFUSION ADD-ON: CPT

## 2018-12-27 PROCEDURE — 80053 COMPREHEN METABOLIC PANEL: CPT | Performed by: NURSE PRACTITIONER

## 2018-12-27 PROCEDURE — 96374 THER/PROPH/DIAG INJ IV PUSH: CPT

## 2018-12-27 PROCEDURE — 81001 URINALYSIS AUTO W/SCOPE: CPT | Performed by: NURSE PRACTITIONER

## 2018-12-27 RX ORDER — TAMSULOSIN HYDROCHLORIDE 0.4 MG/1
1 CAPSULE ORAL NIGHTLY
Qty: 7 CAPSULE | Refills: 0 | Status: SHIPPED | OUTPATIENT
Start: 2018-12-27 | End: 2019-01-03

## 2018-12-27 RX ORDER — ONDANSETRON 4 MG/1
4 TABLET, FILM COATED ORAL EVERY 8 HOURS PRN
Qty: 9 TABLET | Refills: 0 | Status: SHIPPED | OUTPATIENT
Start: 2018-12-27

## 2018-12-27 RX ORDER — SULFAMETHOXAZOLE AND TRIMETHOPRIM 800; 160 MG/1; MG/1
1 TABLET ORAL 2 TIMES DAILY
Qty: 10 TABLET | Refills: 0 | Status: SHIPPED | OUTPATIENT
Start: 2018-12-27

## 2018-12-27 RX ORDER — ONDANSETRON 2 MG/ML
4 INJECTION INTRAMUSCULAR; INTRAVENOUS ONCE
Status: COMPLETED | OUTPATIENT
Start: 2018-12-27 | End: 2018-12-27

## 2018-12-27 RX ORDER — HYDROCODONE BITARTRATE AND ACETAMINOPHEN 5; 325 MG/1; MG/1
1 TABLET ORAL EVERY 6 HOURS PRN
Qty: 12 TABLET | Refills: 0 | Status: SHIPPED | OUTPATIENT
Start: 2018-12-27

## 2018-12-27 RX ORDER — HYDROMORPHONE HYDROCHLORIDE 1 MG/ML
0.5 INJECTION, SOLUTION INTRAMUSCULAR; INTRAVENOUS; SUBCUTANEOUS ONCE
Status: COMPLETED | OUTPATIENT
Start: 2018-12-27 | End: 2018-12-27

## 2018-12-27 RX ADMIN — HYDROMORPHONE HYDROCHLORIDE 0.5 MG: 1 INJECTION, SOLUTION INTRAMUSCULAR; INTRAVENOUS; SUBCUTANEOUS at 17:24

## 2018-12-27 RX ADMIN — IOPAMIDOL 85 ML: 612 INJECTION, SOLUTION INTRAVENOUS at 17:48

## 2018-12-27 RX ADMIN — ONDANSETRON 4 MG: 2 INJECTION INTRAMUSCULAR; INTRAVENOUS at 17:24

## 2018-12-27 RX ADMIN — SODIUM CHLORIDE 500 ML: 9 INJECTION, SOLUTION INTRAVENOUS at 17:24

## 2018-12-29 LAB — BACTERIA SPEC AEROBE CULT: NO GROWTH

## 2024-12-17 ENCOUNTER — TRANSCRIBE ORDERS (OUTPATIENT)
Dept: ADMINISTRATIVE | Facility: HOSPITAL | Age: 79
End: 2024-12-17
Payer: MEDICARE

## 2024-12-17 ENCOUNTER — LAB (OUTPATIENT)
Facility: HOSPITAL | Age: 79
End: 2024-12-17
Payer: MEDICARE

## 2024-12-17 DIAGNOSIS — E11.9 DIABETES MELLITUS WITHOUT COMPLICATION: Primary | ICD-10-CM

## 2024-12-17 DIAGNOSIS — E11.9 DIABETES MELLITUS WITHOUT COMPLICATION: ICD-10-CM

## 2024-12-17 LAB
CHOLEST SERPL-MCNC: 103 MG/DL (ref 0–200)
HBA1C MFR BLD: 7 % (ref 4.8–5.6)
HDLC SERPL-MCNC: 30 MG/DL (ref 40–60)
HOLD SPECIMEN: NORMAL
LDLC SERPL CALC-MCNC: 56 MG/DL (ref 0–100)
LDLC/HDLC SERPL: 1.87 {RATIO}
TRIGL SERPL-MCNC: 84 MG/DL (ref 0–150)
VLDLC SERPL-MCNC: 17 MG/DL (ref 5–40)

## 2024-12-17 PROCEDURE — 80061 LIPID PANEL: CPT

## 2024-12-17 PROCEDURE — 83036 HEMOGLOBIN GLYCOSYLATED A1C: CPT

## 2024-12-17 PROCEDURE — 36415 COLL VENOUS BLD VENIPUNCTURE: CPT

## (undated) DEVICE — ENDOPOUCH RETRIEVER SPECIMEN RETRIEVAL BAGS: Brand: ENDOPOUCH RETRIEVER

## (undated) DEVICE — GLV SURG SENSICARE GREEN W/ALOE PF LF 6.5 STRL

## (undated) DEVICE — DRP C/ARM 41X74IN

## (undated) DEVICE — STPCK 3WY D201 DISCOFIX

## (undated) DEVICE — CATH IV INSYTE AUTOGARD 14G 1 1/2IN ORNG

## (undated) DEVICE — CATH CHOLANG 4.5F18IN BRGNDY

## (undated) DEVICE — ENDOPATH XCEL BLUNT TIP TROCARS WITH SMOOTH SLEEVES: Brand: ENDOPATH XCEL

## (undated) DEVICE — CONTAINER,SPECIMEN,OR STERILE,4OZ: Brand: MEDLINE

## (undated) DEVICE — DRAPE,REIN 53X77,STERILE: Brand: MEDLINE

## (undated) DEVICE — SOL NACL 0.9PCT 1000ML

## (undated) DEVICE — ADHS SKIN DERMABOND TOP ADVANCED

## (undated) DEVICE — GLV SURG BIOGEL LTX PF 6

## (undated) DEVICE — VISUALIZATION SYSTEM: Brand: CLEARIFY

## (undated) DEVICE — GOWN ,SIRUS,NONREINFORCED SMALL: Brand: MEDLINE

## (undated) DEVICE — ENDOPATH XCEL UNIVERSAL TROCAR STABLILITY SLEEVES: Brand: ENDOPATH XCEL

## (undated) DEVICE — SUT VIC 0/0 UR6 27IN DYED J603H

## (undated) DEVICE — ENDOCUT SCISSOR TIP, DISPOSABLE: Brand: RENEW

## (undated) DEVICE — APPL CHLORAPREP W/TINT 26ML ORNG

## (undated) DEVICE — ANTIBACTERIAL UNDYED BRAIDED (POLYGLACTIN 910), SYNTHETIC ABSORBABLE SUTURE: Brand: COATED VICRYL

## (undated) DEVICE — ENDOPATH XCEL BLADELESS TROCARS WITH STABILITY SLEEVES: Brand: ENDOPATH XCEL

## (undated) DEVICE — LOU LAP CHOLE: Brand: MEDLINE INDUSTRIES, INC.

## (undated) DEVICE — EXTENSION SET, MALE LUER LOCK ADAPTER WITH RETRACTABLE COLLAR